# Patient Record
Sex: MALE | Race: WHITE | NOT HISPANIC OR LATINO | Employment: OTHER | URBAN - METROPOLITAN AREA
[De-identification: names, ages, dates, MRNs, and addresses within clinical notes are randomized per-mention and may not be internally consistent; named-entity substitution may affect disease eponyms.]

---

## 2017-01-11 RX ORDER — INDOMETHACIN 25 MG/1
CAPSULE ORAL
Qty: 20 CAPSULE | Refills: 0 | Status: SHIPPED | OUTPATIENT
Start: 2017-01-11 | End: 2018-08-28

## 2018-04-15 ENCOUNTER — OFFICE VISIT (OUTPATIENT)
Dept: RETAIL CLINIC | Facility: CLINIC | Age: 78
End: 2018-04-15

## 2018-04-15 VITALS
OXYGEN SATURATION: 98 % | SYSTOLIC BLOOD PRESSURE: 143 MMHG | RESPIRATION RATE: 16 BRPM | HEART RATE: 75 BPM | TEMPERATURE: 98.5 F | DIASTOLIC BLOOD PRESSURE: 97 MMHG

## 2018-04-15 DIAGNOSIS — K04.7 DENTAL ABSCESS: Primary | ICD-10-CM

## 2018-04-15 PROCEDURE — 99203 OFFICE O/P NEW LOW 30 MIN: CPT | Performed by: NURSE PRACTITIONER

## 2018-04-15 RX ORDER — AMOXICILLIN 875 MG/1
875 TABLET, COATED ORAL 2 TIMES DAILY
Qty: 20 TABLET | Refills: 0 | Status: SHIPPED | OUTPATIENT
Start: 2018-04-15 | End: 2018-04-25

## 2018-04-15 NOTE — PROGRESS NOTES
Subjective   Jovon Knight is a 78 y.o. male.     Dental Pain    This is a new problem. The current episode started in the past 7 days. The problem occurs constantly. The problem has been gradually worsening. The pain is moderate. Associated symptoms include facial pain and sinus pressure. Pertinent negatives include no difficulty swallowing, fever, oral bleeding or thermal sensitivity. Treatments tried: ambusol. The treatment provided no relief.       The following portions of the patient's history were reviewed and updated as appropriate: allergies, current medications, past family history, past medical history, past social history, past surgical history and problem list.    Review of Systems   Constitutional: Positive for fatigue. Negative for appetite change, chills, diaphoresis and fever.   HENT: Positive for dental problem, facial swelling (maxillary ), mouth sores and sinus pressure. Negative for congestion, ear discharge, ear pain, hearing loss, nosebleeds, postnasal drip, rhinorrhea, sinus pain, sneezing, sore throat, trouble swallowing and voice change.    Eyes: Negative for pain, discharge, redness, itching and visual disturbance.   Respiratory: Negative for cough, chest tightness, shortness of breath and wheezing.    Cardiovascular: Negative for chest pain and palpitations.   Gastrointestinal: Negative for nausea.   Musculoskeletal: Negative for myalgias, neck pain and neck stiffness.   Allergic/Immunologic: Positive for environmental allergies. Negative for immunocompromised state.   Neurological: Negative for syncope, weakness and headaches.       Objective   Physical Exam   Constitutional: He is oriented to person, place, and time. He appears well-developed and well-nourished. He is cooperative.  Non-toxic appearance. He does not appear ill. No distress.   HENT:   Head: Normocephalic and atraumatic.   Right Ear: Hearing, tympanic membrane, external ear and ear canal normal.   Left Ear: Hearing,  tympanic membrane, external ear and ear canal normal.   Nose: Right sinus exhibits maxillary sinus tenderness. Right sinus exhibits no frontal sinus tenderness. Left sinus exhibits maxillary sinus tenderness. Left sinus exhibits no frontal sinus tenderness.   Mouth/Throat: Uvula is midline, oropharynx is clear and moist and mucous membranes are normal. Oral lesions present. No uvula swelling. No oropharyngeal exudate, posterior oropharyngeal edema or posterior oropharyngeal erythema. Tonsils are 2+ on the right. Tonsils are 2+ on the left. No tonsillar exudate.       Eyes: Conjunctivae and lids are normal.   Lymphadenopathy:     He has no cervical adenopathy.   Neurological: He is alert and oriented to person, place, and time.   Skin: Skin is warm and dry. He is not diaphoretic.   Vitals reviewed.      Assessment/Plan   Jovon was seen today for dental pain.    Diagnoses and all orders for this visit:    Dental abscess  -     amoxicillin (AMOXIL) 875 MG tablet; Take 1 tablet by mouth 2 (Two) Times a Day for 10 days.          -     Call dentist in AM to request follow up appointment        -     Follow up with [facility] for any worsening symptoms

## 2018-04-15 NOTE — PATIENT INSTRUCTIONS
Please call your dentist tomorrow morning to make an appointment for follow up.    Dental Abscess  A dental abscess is a collection of pus in or around a tooth.  What are the causes?  This condition is caused by a bacterial infection around the root of the tooth that involves the inner part of the tooth (pulp). It may result from:  · Severe tooth decay.  · Trauma to the tooth that allows bacteria to enter into the pulp, such as a broken or chipped tooth.  · Severe gum disease around a tooth.  What are the signs or symptoms?  Symptoms of this condition include:  · Severe pain in and around the infected tooth.  · Swelling and redness around the infected tooth, in the mouth, or in the face.  · Tenderness.  · Pus drainage.  · Bad breath.  · Bitter taste in the mouth.  · Difficulty swallowing.  · Difficulty opening the mouth.  · Nausea.  · Vomiting.  · Chills.  · Swollen neck glands.  · Fever.  How is this diagnosed?  This condition is diagnosed with examination of the infected tooth. During the exam, your dentist may tap on the infected tooth. Your dentist will also ask about your medical and dental history and may order X-rays.  How is this treated?  This condition is treated by eliminating the infection. This may be done with:  · Antibiotic medicine.  · A root canal. This may be performed to save the tooth.  · Pulling (extracting) the tooth. This may also involve draining the abscess. This is done if the tooth cannot be saved.  Follow these instructions at home:  · Take medicines only as directed by your dentist.  · If you were prescribed antibiotic medicine, finish all of it even if you start to feel better.  · Rinse your mouth (gargle) often with salt water to relieve pain or swelling.  · Do not drive or operate heavy machinery while taking pain medicine.  · Do not apply heat to the outside of your mouth.  · Keep all follow-up visits as directed by your dentist. This is important.  Contact a health care provider  if:  · Your pain is worse and is not helped by medicine.  Get help right away if:  · You have a fever or chills.  · Your symptoms suddenly get worse.  · You have a very bad headache.  · You have problems breathing or swallowing.  · You have trouble opening your mouth.  · You have swelling in your neck or around your eye.  This information is not intended to replace advice given to you by your health care provider. Make sure you discuss any questions you have with your health care provider.  Document Released: 12/18/2006 Document Revised: 04/27/2017 Document Reviewed: 12/15/2015  Elsevier Interactive Patient Education © 2017 [employer]

## 2018-08-28 ENCOUNTER — OFFICE VISIT (OUTPATIENT)
Dept: RETAIL CLINIC | Facility: CLINIC | Age: 78
End: 2018-08-28

## 2018-08-28 DIAGNOSIS — M10.9 ACUTE GOUT OF RIGHT FOOT, UNSPECIFIED CAUSE: Primary | ICD-10-CM

## 2018-08-28 PROCEDURE — 99213 OFFICE O/P EST LOW 20 MIN: CPT | Performed by: NURSE PRACTITIONER

## 2018-08-28 RX ORDER — INDOMETHACIN 25 MG/1
25 CAPSULE ORAL 3 TIMES DAILY PRN
Qty: 24 CAPSULE | Refills: 1 | Status: SHIPPED | OUTPATIENT
Start: 2018-08-28 | End: 2019-11-21 | Stop reason: SDUPTHER

## 2018-08-28 RX ORDER — INDOMETHACIN 25 MG/1
CAPSULE ORAL
Qty: 20 CAPSULE | OUTPATIENT
Start: 2018-08-28

## 2018-08-28 NOTE — PATIENT INSTRUCTIONS
Gout  Gout is painful swelling that can occur in some of your joints. Gout is a type of arthritis. This condition is caused by having too much uric acid in your body. Uric acid is a chemical that forms when your body breaks down substances called purines. Purines are important for building body proteins.  When your body has too much uric acid, sharp crystals can form and build up inside your joints. This causes pain and swelling. Gout attacks can happen quickly and be very painful (acute gout). Over time, the attacks can affect more joints and become more frequent (chronic gout). Gout can also cause uric acid to build up under your skin and inside your kidneys.  What are the causes?  This condition is caused by too much uric acid in your blood. This can occur because:  · Your kidneys do not remove enough uric acid from your blood. This is the most common cause.  · Your body makes too much uric acid. This can occur with some cancers and cancer treatments. It can also occur if your body is breaking down too many red blood cells (hemolytic anemia).  · You eat too many foods that are high in purines. These foods include organ meats and some seafood. Alcohol, especially beer, is also high in purines.    A gout attack may be triggered by trauma or stress.  What increases the risk?  This condition is more likely to develop in people who:  · Have a family history of gout.  · Are male and middle-aged.  · Are female and have gone through menopause.  · Are obese.  · Frequently drink alcohol, especially beer.  · Are dehydrated.  · Lose weight too quickly.  · Have an organ transplant.  · Have lead poisoning.  · Take certain medicines, including aspirin, cyclosporine, diuretics, levodopa, and niacin.  · Have kidney disease or psoriasis.    What are the signs or symptoms?  An attack of acute gout happens quickly. It usually occurs in just one joint. The most common place is the big toe. Attacks often start at night. Other joints  that may be affected include joints of the feet, ankle, knee, fingers, wrist, or elbow. Symptoms may include:  · Severe pain.  · Warmth.  · Swelling.  · Stiffness.  · Tenderness. The affected joint may be very painful to touch.  · Shiny, red, or purple skin.  · Chills and fever.    Chronic gout may cause symptoms more frequently. More joints may be involved. You may also have white or yellow lumps (tophi) on your hands or feet or in other areas near your joints.  How is this diagnosed?  This condition is diagnosed based on your symptoms, medical history, and physical exam. You may have tests, such as:  · Blood tests to measure uric acid levels.  · Removal of joint fluid with a needle (aspiration) to look for uric acid crystals.  · X-rays to look for joint damage.    How is this treated?  Treatment for this condition has two phases: treating an acute attack and preventing future attacks. Acute gout treatment may include medicines to reduce pain and swelling, including:  · NSAIDs.  · Steroids. These are strong anti-inflammatory medicines that can be taken by mouth (orally) or injected into a joint.  · Colchicine. This medicine relieves pain and swelling when it is taken soon after an attack. It can be given orally or through an IV tube.    Preventive treatment may include:  · Daily use of smaller doses of NSAIDs or colchicine.  · Use of a medicine that reduces uric acid levels in your blood.  · Changes to your diet. You may need to see a specialist about healthy eating (dietitian).    Follow these instructions at home:  During a Gout Attack  · If directed, apply ice to the affected area:  ? Put ice in a plastic bag.  ? Place a towel between your skin and the bag.  ? Leave the ice on for 20 minutes, 2-3 times a day.  · Rest the joint as much as possible. If the affected joint is in your leg, you may be given crutches to use.  · Raise (elevate) the affected joint above the level of your heart as often as  possible.  · Drink enough fluids to keep your urine clear or pale yellow.  · Take over-the-counter and prescription medicines only as told by your health care provider.  · Do not drive or operate heavy machinery while taking prescription pain medicine.  · Follow instructions from your health care provider about eating or drinking restrictions.  · Return to your normal activities as told by your health care provider. Ask your health care provider what activities are safe for you.  Avoiding Future Gout Attacks  · Follow a low-purine diet as told by your dietitian or health care provider. Avoid foods and drinks that are high in purines, including liver, kidney, anchovies, asparagus, herring, mushrooms, mussels, and beer.  · Limit alcohol intake to no more than 1 drink a day for nonpregnant women and 2 drinks a day for men. One drink equals 12 oz of beer, 5 oz of wine, or 1½ oz of hard liquor.  · Maintain a healthy weight or lose weight if you are overweight. If you want to lose weight, talk with your health care provider. It is important that you do not lose weight too quickly.  · Start or maintain an exercise program as told by your health care provider.  · Drink enough fluids to keep your urine clear or pale yellow.  · Take over-the-counter and prescription medicines only as told by your health care provider.  · Keep all follow-up visits as told by your health care provider. This is important.  Contact a health care provider if:  · You have another gout attack.  · You continue to have symptoms of a gout attack after10 days of treatment.  · You have side effects from your medicines.  · You have chills or a fever.  · You have burning pain when you urinate.  · You have pain in your lower back or belly.  Get help right away if:  · You have severe or uncontrolled pain.  · You cannot urinate.  This information is not intended to replace advice given to you by your health care provider. Make sure you discuss any questions  you have with your health care provider.  Document Released: 12/15/2001 Document Revised: 05/25/2017 Document Reviewed: 09/29/2016  Elsevier Interactive Patient Education © 2018 [employer]

## 2018-08-28 NOTE — PROGRESS NOTES
Subjective   Jovon Knight is a 78 y.o. male.     Gout   This is a new problem. The current episode started 1 to 4 weeks ago. The problem occurs constantly. The problem has been gradually worsening. Associated symptoms include joint swelling. Pertinent negatives include no fever or headaches. Associated symptoms comments: Right proximal big toe joint. The symptoms are aggravated by walking. Treatments tried: warm water, epsom salt, cherry supplements. The treatment provided no relief.        The following portions of the patient's history were reviewed and updated as appropriate: allergies, current medications, past family history, past medical history, past social history, past surgical history and problem list.    Review of Systems   Constitutional: Negative for fever.   HENT: Negative.    Gastrointestinal: Negative.    Genitourinary: Negative.    Musculoskeletal: Positive for gout and joint swelling.   Neurological: Negative.        Objective   Physical Exam   Constitutional: He is cooperative. No distress.   HENT:   Head: Normocephalic.   Right Ear: Hearing, tympanic membrane, external ear and ear canal normal.   Left Ear: Hearing, tympanic membrane, external ear and ear canal normal.   Nose: Nose normal.   Mouth/Throat: Oropharynx is clear and moist.   Eyes: Pupils are equal, round, and reactive to light. Conjunctivae, EOM and lids are normal.   Neck: Trachea normal and full passive range of motion without pain.   Cardiovascular: Normal rate, regular rhythm and normal pulses.    Pulmonary/Chest: Effort normal and breath sounds normal.        Neurological: He is alert.   Skin: Skin is warm. Capillary refill takes less than 2 seconds.   Psychiatric: He has a normal mood and affect. His speech is normal and behavior is normal.   Vitals reviewed.        Assessment/Plan   Jovon was seen today for gout.    Diagnoses and all orders for this visit:    Acute gout of right foot, unspecified cause    Other orders  -      indomethacin (INDOCIN) 25 MG capsule; Take 1 capsule by mouth 3 (Three) Times a Day As Needed for Mild Pain .

## 2018-08-29 RX ORDER — INDOMETHACIN 25 MG/1
CAPSULE ORAL
Qty: 20 CAPSULE | OUTPATIENT
Start: 2018-08-29

## 2019-11-21 ENCOUNTER — OFFICE VISIT (OUTPATIENT)
Dept: RETAIL CLINIC | Facility: CLINIC | Age: 79
End: 2019-11-21

## 2019-11-21 VITALS
TEMPERATURE: 97.8 F | HEART RATE: 64 BPM | SYSTOLIC BLOOD PRESSURE: 164 MMHG | OXYGEN SATURATION: 99 % | DIASTOLIC BLOOD PRESSURE: 95 MMHG

## 2019-11-21 DIAGNOSIS — M10.9 GOUT INVOLVING TOE OF LEFT FOOT, UNSPECIFIED CAUSE, UNSPECIFIED CHRONICITY: Primary | ICD-10-CM

## 2019-11-21 PROCEDURE — 99213 OFFICE O/P EST LOW 20 MIN: CPT | Performed by: NURSE PRACTITIONER

## 2019-11-21 RX ORDER — INDOMETHACIN 25 MG/1
25 CAPSULE ORAL 3 TIMES DAILY PRN
Qty: 24 CAPSULE | Refills: 1 | Status: SHIPPED | OUTPATIENT
Start: 2019-11-21 | End: 2021-03-30 | Stop reason: SDUPTHER

## 2019-11-21 NOTE — PATIENT INSTRUCTIONS
Gout    Gout is a condition that causes painful swelling of the joints. Gout is a type of inflammation of the joints (arthritis). This condition is caused by having too much uric acid in the body. Uric acid is a chemical that forms when the body breaks down substances called purines. Purines are important for building body proteins.  When the body has too much uric acid, sharp crystals can form and build up inside the joints. This causes pain and swelling. Gout attacks can happen quickly and may be very painful (acute gout). Over time, the attacks can affect more joints and become more frequent (chronic gout). Gout can also cause uric acid to build up under the skin and inside the kidneys.  What are the causes?  This condition is caused by too much uric acid in your blood. This can happen because:  · Your kidneys do not remove enough uric acid from your blood. This is the most common cause.  · Your body makes too much uric acid. This can happen with some cancers and cancer treatments. It can also occur if your body is breaking down too many red blood cells (hemolytic anemia).  · You eat too many foods that are high in purines. These foods include organ meats and some seafood. Alcohol, especially beer, is also high in purines.  A gout attack may be triggered by trauma or stress.  What increases the risk?  You are more likely to develop this condition if you:  · Have a family history of gout.  · Are male and middle-aged.  · Are female and have gone through menopause.  · Are obese.  · Frequently drink alcohol, especially beer.  · Are dehydrated.  · Lose weight too quickly.  · Have an organ transplant.  · Have lead poisoning.  · Take certain medicines, including aspirin, cyclosporine, diuretics, levodopa, and niacin.  · Have kidney disease.  · Have a skin condition called psoriasis.  What are the signs or symptoms?  An attack of acute gout happens quickly. It usually occurs in just one joint. The most common place is  the big toe. Attacks often start at night. Other joints that may be affected include joints of the feet, ankle, knee, fingers, wrist, or elbow. Symptoms of this condition may include:  · Severe pain.  · Warmth.  · Swelling.  · Stiffness.  · Tenderness. The affected joint may be very painful to touch.  · Shiny, red, or purple skin.  · Chills and fever.  Chronic gout may cause symptoms more frequently. More joints may be involved. You may also have white or yellow lumps (tophi) on your hands or feet or in other areas near your joints.  How is this diagnosed?  This condition is diagnosed based on your symptoms, medical history, and physical exam. You may have tests, such as:  · Blood tests to measure uric acid levels.  · Removal of joint fluid with a thin needle (aspiration) to look for uric acid crystals.  · X-rays to look for joint damage.  How is this treated?  Treatment for this condition has two phases: treating an acute attack and preventing future attacks. Acute gout treatment may include medicines to reduce pain and swelling, including:  · NSAIDs.  · Steroids. These are strong anti-inflammatory medicines that can be taken by mouth (orally) or injected into a joint.  · Colchicine. This medicine relieves pain and swelling when it is taken soon after an attack. It can be given by mouth or through an IV.  Preventive treatment may include:  · Daily use of smaller doses of NSAIDs or colchicine.  · Use of a medicine that reduces uric acid levels in your blood.  · Changes to your diet. You may need to see a dietitian about what to eat and drink to prevent gout.  Follow these instructions at home:  During a gout attack    · If directed, put ice on the affected area:  ? Put ice in a plastic bag.  ? Place a towel between your skin and the bag.  ? Leave the ice on for 20 minutes, 2-3 times a day.  · Raise (elevate) the affected joint above the level of your heart as often as possible.  · Rest the joint as much as possible.  If the affected joint is in your leg, you may be given crutches to use.  · Follow instructions from your health care provider about eating or drinking restrictions.  Avoiding future gout attacks  · Follow a low-purine diet as told by your dietitian or health care provider. Avoid foods and drinks that are high in purines, including liver, kidney, anchovies, asparagus, herring, mushrooms, mussels, and beer.  · Maintain a healthy weight or lose weight if you are overweight. If you want to lose weight, talk with your health care provider. It is important that you do not lose weight too quickly.  · Start or maintain an exercise program as told by your health care provider.  Eating and drinking  · Drink enough fluids to keep your urine pale yellow.  · If you drink alcohol:  ? Limit how much you use to:  § 0-1 drink a day for women.  § 0-2 drinks a day for men.  ? Be aware of how much alcohol is in your drink. In the [place] one drink equals one 12 oz bottle of beer (355 mL) one 5 oz glass of wine (148 mL), or one 1½ oz glass of hard liquor (44 mL).  General instructions  · Take over-the-counter and prescription medicines only as told by your health care provider.  · Do not drive or use heavy machinery while taking prescription pain medicine.  · Return to your normal activities as told by your health care provider. Ask your health care provider what activities are safe for you.  · Keep all follow-up visits as told by your health care provider. This is important.  Contact a health care provider if you have:  · Another gout attack.  · Continuing symptoms of a gout attack after 10 days of treatment.  · Side effects from your medicines.  · Chills or a fever.  · Burning pain when you urinate.  · Pain in your lower back or belly.  Get help right away if you:  · Have severe or uncontrolled pain.  · Cannot urinate.  Summary  · Gout is painful swelling of the joints caused by inflammation.  · The most common site of pain is the big  toe, but it can affect other joints in the body.  · Medicines and dietary changes can help to prevent and treat gout attacks.  This information is not intended to replace advice given to you by your health care provider. Make sure you discuss any questions you have with your health care provider.  Document Released: 12/15/2001 Document Revised: 07/10/2019 Document Reviewed: 07/10/2019  [employer] Interactive Patient Education © 2019 [employer]

## 2019-11-21 NOTE — PROGRESS NOTES
"Subjective:     Jovon Knight is a 79 y.o.     Patient presents with \"gout\". It is in his left great toe. He has a history of gout., He has taken indomethacin for gout in the past and he had a few left which he took sparingly instead of as directed because he was almost out. It ws in effective. He was drinking a ew beers which seems to set this off but quit that  A couple of weeks ago. The gout has been bothering him for about 3 weeks. The joint is red, swollen, painful.            The following portions of the patient's history were reviewed and updated as appropriate: allergies, current medications, past family history, past medical history, past social history, past surgical history and problem list.      Review of Systems   Constitutional: Negative for fever.   Respiratory: Negative.    Cardiovascular: Negative.    Musculoskeletal: Joint swelling: left great toe, red, swollen, painful.         Objective:      Physical Exam   Constitutional: He appears well-developed and well-nourished.   Cardiovascular: Normal rate, regular rhythm, S1 normal, S2 normal and normal heart sounds.   Pulmonary/Chest: Effort normal and breath sounds normal.   Musculoskeletal:   Left great toe erythematous, edematous   Vitals reviewed.          Diagnoses and all orders for this visit:    Gout involving toe of left foot, unspecified cause, unspecified chronicity  -     Ambulatory Referral to Family Practice    Other orders  -     indomethacin (INDOCIN) 25 MG capsule; Take 1 capsule by mouth 3 (Three) Times a Day As Needed for Mild Pain .    monitor diet  "

## 2020-01-08 PROBLEM — E78.5 HYPERLIPIDEMIA: Status: ACTIVE | Noted: 2020-01-08

## 2020-01-08 PROBLEM — C44.91 BASAL CELL CARCINOMA (BCC): Status: ACTIVE | Noted: 2020-01-08

## 2020-01-08 PROBLEM — C18.9 COLON CANCER (HCC): Status: ACTIVE | Noted: 2020-01-08

## 2022-05-12 ENCOUNTER — TELEPHONE (OUTPATIENT)
Dept: FAMILY MEDICINE CLINIC | Facility: CLINIC | Age: 82
End: 2022-05-12

## 2022-05-12 NOTE — TELEPHONE ENCOUNTER
Nirali, I am not sure that I have had this conversation with him.  Certainly we have not seen him recently.  I think the best thing for him to do would be to establish with Lu or Pinky and then we will see how it goes for this problem since I won't be available in the near future.

## 2022-05-12 NOTE — TELEPHONE ENCOUNTER
Caller: Jovon Knight    Relationship: Self    Best call back number: [phone]    What is the best time to reach you: ANY TIME    Who are you requesting to speak with (clinical staff, provider,  specific staff member): CLINICAL STAFF    What was the call regarding: PATIENT STATES DR. WHITNEY AGREED TO TAKE HIM BACK ON, HE SAW HIM SEVERAL YEARS AGO. SINCE HE IS NOT TAKING NEW PATIENTS, NO SLOTS ARE OPEN IN [facility].    PATIENT IS HAVING PAIN IN RIGHT FOOT, UNABLE TO BEAR WEIGHT    PATIENT STATES IT IS OKAY TO LEAVE VOICEMAIL IF HE DOES NOT ANSWER    PLEASE CALL PATIENT TO SCHEDULE    Do you require a callback: YES

## 2022-05-16 ENCOUNTER — OFFICE VISIT (OUTPATIENT)
Dept: FAMILY MEDICINE CLINIC | Facility: CLINIC | Age: 82
End: 2022-05-16

## 2022-05-16 VITALS
OXYGEN SATURATION: 99 % | SYSTOLIC BLOOD PRESSURE: 112 MMHG | HEART RATE: 69 BPM | WEIGHT: 157 LBS | HEIGHT: 68 IN | BODY MASS INDEX: 23.79 KG/M2 | RESPIRATION RATE: 18 BRPM | DIASTOLIC BLOOD PRESSURE: 70 MMHG | TEMPERATURE: 97.8 F

## 2022-05-16 DIAGNOSIS — M25.551 RIGHT HIP PAIN: ICD-10-CM

## 2022-05-16 DIAGNOSIS — M79.671 RIGHT FOOT PAIN: Primary | ICD-10-CM

## 2022-05-16 PROCEDURE — 99213 OFFICE O/P EST LOW 20 MIN: CPT | Performed by: NURSE PRACTITIONER

## 2022-05-16 NOTE — PROGRESS NOTES
"Chief Complaint  Establish Care and [facility] [place] f/u (5-13-22 right foot pain hurt while mowing grass)    Subjective          Jovon presents to [facility]    82-year-old male presents the office today for establishment of care and follow-up after being seen in the urgent care on 5/13/2022 for some right foot pain after he was mowing the grass.  States that he was wearing some oversized boots and did not have thickening of socks on.  Urgent care thought that he probably did not have enough support on his foot causing instep pain.  He has taken the diclofenac as prescribed and does not report some relief.  He does have some right ankle and thigh/hip soreness that he had post mowing that has also continued to improve.  He denies any swelling.  He is using over-the-counter pain cream with good relief.  No side effects.    Blood pressure in office today was 112/70.  He denies any headache no blurred vision no dizziness no flushing no chest pain or pressure.    He is not fasting today does not want to do labs will return    He has no other complaints at today    The following portions of the patient's history were reviewed and updated as appropriate: allergies, current medications, past family history, past medical history, past social history, past surgical history, and problem list        Review of Systems   Constitutional: Negative for chills, fatigue and fever.   Respiratory: Negative for cough and shortness of breath.    Cardiovascular: Negative for chest pain, palpitations and leg swelling.   Gastrointestinal: Negative for abdominal pain, diarrhea, nausea and vomiting.   Musculoskeletal: Positive for arthralgias.   Neurological: Negative for dizziness and light-headedness.        Objective   Vital Signs:   Vitals:    05/16/22 1038   BP: 112/70   Pulse: 69   Resp: 18   Temp: 97.8 °F (36.6 °C)   SpO2: 99%   Weight: 71.2 kg (157 lb)   Height: 172 cm (67.72\")   PainSc:   2        BMI is within " normal parameters. No follow-up required.       Physical Exam  Vitals reviewed.   Constitutional:       Appearance: Normal appearance. He is not ill-appearing.   HENT:      Head: Normocephalic.      Nose: Nose normal.      Mouth/Throat:      Mouth: Mucous membranes are moist.      Pharynx: Oropharynx is clear.   Eyes:      Conjunctiva/sclera: Conjunctivae normal.   Neck:      Vascular: No carotid bruit.   Cardiovascular:      Rate and Rhythm: Normal rate and regular rhythm.      Pulses: Normal pulses.      Heart sounds: Normal heart sounds. No murmur heard.  Pulmonary:      Effort: Pulmonary effort is normal.      Breath sounds: Normal breath sounds.   Musculoskeletal:      Right foot: Normal range of motion.        Legs:         Feet:    Feet:      Right foot:      Skin integrity: No ulcer, blister, skin breakdown, erythema, warmth, callus, dry skin or fissure.   Skin:     General: Skin is warm.   Neurological:      Mental Status: He is alert and oriented to person, place, and time.   Psychiatric:         Mood and Affect: Mood normal.         Behavior: Behavior normal.         Thought Content: Thought content normal.         Judgment: Judgment normal.          Result Review :     The following data was reviewed by: BEATA Webber on 05/16/2022:      Needs lab work     Assessment and Plan    Diagnoses and all orders for this visit:    1. Right foot pain (Primary)  Comments:  continue diclofenac  RICE  wear supportive footwear when mowing grass/ walking/ hiking    2. Right hip pain  Comments:  improving  RICE  continue current rx    Will refer to podiatry / pt if pt desires      Follow Up   Return in about 6 months (around 11/16/2022) for Annual physical.  Patient was given instructions and counseling regarding his condition or for health maintenance advice. Please see specific information pulled into the AVS if appropriate.

## 2022-12-12 ENCOUNTER — OFFICE VISIT (OUTPATIENT)
Dept: ORTHOPEDIC SURGERY | Facility: CLINIC | Age: 82
End: 2022-12-12

## 2022-12-12 VITALS — BODY MASS INDEX: 23.05 KG/M2 | TEMPERATURE: 97.1 F | HEIGHT: 68 IN | WEIGHT: 152.1 LBS

## 2022-12-12 DIAGNOSIS — M54.16 LUMBAR RADICULAR PAIN: ICD-10-CM

## 2022-12-12 DIAGNOSIS — M47.817 OSTEOARTHRITIS OF LUMBOSACRAL SPINE: Primary | ICD-10-CM

## 2022-12-12 PROCEDURE — 72100 X-RAY EXAM L-S SPINE 2/3 VWS: CPT | Performed by: NURSE PRACTITIONER

## 2022-12-12 PROCEDURE — 99214 OFFICE O/P EST MOD 30 MIN: CPT | Performed by: NURSE PRACTITIONER

## 2022-12-12 RX ORDER — METHYLPREDNISOLONE 4 MG/1
TABLET ORAL
Qty: 21 TABLET | Refills: 0 | Status: SHIPPED | OUTPATIENT
Start: 2022-12-12

## 2022-12-18 NOTE — PROGRESS NOTES
[facility]  New Problem      Patient Name: Jovon Knight  : 1940  Primary Care Physician: Pinky Ludwig APRN        Chief Complaint: Right hip and leg pain  HPI:   Jovon Knight is a 82 y.o. year old who presents today for evaluation of right hip and leg pain.  Patient reports a history of right hip and lower leg pain that started around May 2022 he was mowing the yard and noticed the pain began and has since progressed.  He says most of his discomfort is in the lower calf pains about a 4-5, pain in the hip and thighs about a 2-3.  He says his pain is worse with walking uphill, going down hills okay, sitting and walking precipitate the calf pain is better with rest.  He does have occasional numbness and tingling in the right lower extremity, no bowel or bladder changes.  He does have a history of gout flareups.  He was recently prescribed low-dose meloxicam he is been taking that for the last week without much improvement so far.  He was previously very avid runner logged over 20,000 miles at 1 point and participated in numerous races.  Past medical history significant for colon cancer gallbladder removal and as noted below.      Past Medical/Surgical, Social and Family History:  I have reviewed and/or updated pertinent history as noted in the medical record including:  Past Medical History:   Diagnosis Date   • Allergic    • Cancer (HCC)     colon   • Gout      Past Surgical History:   Procedure Laterality Date   • CHOLECYSTECTOMY     • COLON SURGERY     • DENTAL PROCEDURE      during teens, 2 dental implants      Social History     Occupational History   • Not on file   Tobacco Use   • Smoking status: Never   • Smokeless tobacco: Never   Vaping Use   • Vaping Use: Never used   Substance and Sexual Activity   • Alcohol use: No   • Drug use: No   • Sexual activity: Defer          Allergies: No Known Allergies    Medications:   Home Medications:  Current Outpatient Medications on File Prior to  Visit   Medication Sig   • meloxicam (MOBIC) 7.5 MG tablet Take 1 tablet by mouth Daily.     No current facility-administered medications on file prior to visit.         ROS:  14 point review of systems was negative except as listed in the HPI.    Physical Exam:   82 y.o. male  Body mass index is 23.13 kg/m²., 69 kg (152 lb 1.6 oz)  Vitals:    12/12/22 1031   Temp: 97.1 °F (36.2 °C)     General: Alert, cooperative, appears well and in no observable distress. Appears stated age and BMI as listed above.  HEENT: Normocephalic, atraumatic on external visual inspection.  CV: No significant peripheral edema.  Respiratory: Normal respiratory effort.  Skin: Warm & well perfused; appropriate skin turgor.  Psych: Appropriate mood & affect.  Neuro: Gross sensation and motor intact in affected extremity/extremities.  Vascular: Peripheral pulses palpable in affected extremity/extremities.     MSK Exam:  Lumbar Spine:  No obvious deformity.  Tenderness to palpation: None  ROM limited without pain.  negative SLR test on the bilateral  Quad strength equal BLE, equal pedal strength with DF/PF, extends great toe BLE.     Range of motion in the knees and hips are smooth and symmetric without significant pain.  Negative SHANIQUA test.  Calves are soft and nontender.      Radiology:    The following X-rays were ordered/reviewed today to evaluate the patient's symptoms: Spine: AP and Lateral view of the lumbar spine shows Significant degenerative changes, scoliosis throughout the lumbar spine, hypertrophic facet joint changes.  Prominent vascular calcifications are noted in the aorta and pelvis.  He does have some narrowing on the hip joint on the right although the hip is incompletely imaged on this exam..  I have no other prior spine films for comparison.    I also reviewed prior films from primary care of his right foot which demonstrate prominent vascular calcifications as well.    Procedure:   N/A    Procedures    Misc. Data/Labs:  N/A    Assessment & Plan:    ICD-10-CM ICD-9-CM   1. Osteoarthritis of lumbosacral spine  M47.817 721.3   2. Lumbar radicular pain  M54.16 724.4     New Medications Ordered This Visit   Medications   • methylPREDNISolone (MEDROL) 4 MG dose pack     Sig: Use as directed by package instructions     Dispense:  21 tablet     Refill:  0     Orders Placed This Encounter   Procedures   • XR Spine Lumbar 2 or 3 View     This is an 82-year-old male with right lower extremity pain its been going on and worsening since May of this year.  I suspect his symptoms are related to lumbar spine pathology.  He does have a history of gout flareups which I have considered, also the prominence of vascular calcifications could be concerning for intermittent claudication.  I am going to start him on a Medrol Dosepak and see if this improves his symptoms.  I asked him to hold the meloxicam while he is taking the Dosepak if he gets great relief I would feel this is more coming from his lumbar spine if he gets no relief we might consider additional work-up perhaps ABIs but his lower extremities appear warm and well-perfused on exam today.    Ultimately we may need to consider an MRI of the lumbar spine and referring him onto neurosurgery.    Return in about 3 weeks (around 1/2/2023) for Recheck.    Patient encouraged to call with questions or concerns prior to follow up.  Recommend ICE and/or HEAT PRN as discussed.  Will discuss with attending physician as needed.  Consider additional referrals, work up and/or advanced imaging as indicated or if patient fails to respond to conservative care.        Jan Rogers, APRN   no

## 2023-01-10 ENCOUNTER — OFFICE VISIT (OUTPATIENT)
Dept: ORTHOPEDIC SURGERY | Facility: CLINIC | Age: 83
End: 2023-01-10
Payer: MEDICARE

## 2023-01-10 VITALS — TEMPERATURE: 98.6 F | WEIGHT: 149.3 LBS | HEIGHT: 68 IN | BODY MASS INDEX: 22.63 KG/M2

## 2023-01-10 DIAGNOSIS — Z00.00 HEALTHCARE MAINTENANCE: ICD-10-CM

## 2023-01-10 DIAGNOSIS — M47.817 OSTEOARTHRITIS OF LUMBOSACRAL SPINE: Primary | ICD-10-CM

## 2023-01-10 DIAGNOSIS — I73.9 CLAUDICATION OF RIGHT LOWER EXTREMITY: ICD-10-CM

## 2023-01-10 DIAGNOSIS — M54.16 LUMBAR RADICULAR PAIN: ICD-10-CM

## 2023-01-10 DIAGNOSIS — I99.8 VASCULAR CALCIFICATION: ICD-10-CM

## 2023-01-10 PROCEDURE — 99214 OFFICE O/P EST MOD 30 MIN: CPT | Performed by: NURSE PRACTITIONER

## 2023-01-10 RX ORDER — ASPIRIN 81 MG/1
81 TABLET, CHEWABLE ORAL DAILY
COMMUNITY

## 2023-01-10 NOTE — PROGRESS NOTES
[facility]              Follow Up      Patient Name: Jovon Knight  : 1940  Primary Care Physician: Pinky Ludwig APRN        Chief Complaint:  Right leg pain.    HPI:   Jovon Knight is a 82 y.o. year old who presents today for follow up evaluation of right leg pain. Patient reports a history of right hip and lower leg pain that started around May 2022 he was mowing the yard and noticed the pain began and has since progressed.  He says most of his discomfort is in the lower calf pains about a 4-5, pain in the hip and thighs about a 2-3.  He says his pain is worse with walking uphill, going down hills okay, sitting and walking precipitate the calf pain is better with rest.  He does have occasional numbness and tingling in the right lower extremity, no bowel or bladder changes.  He does have a history of gout flareups.  He was previously very avid runner logged over 20,000 miles at 1 point and participated in numerous races.  Past medical history significant for colon cancer gallbladder removal and as noted below.     At the last visit 3 weeks ago we got some imaging of his lumbar spine which showed significant degenerative changes.  I wanted to try a Medrol Dosepak to try and help control the symptoms to see if this was neurologic claudication.  He says he got no relief whatsoever from the Dosepak.  Previously he was prescribed a low-dose of meloxicam which also did nothing to alleviate his symptoms.      Past Medical/Surgical, Social and Family History:  I have reviewed and/or updated pertinent history as noted in the medical record including:  Past Medical History:   Diagnosis Date   • Allergic    • Cancer (HCC)     colon   • Gout      Past Surgical History:   Procedure Laterality Date   • CHOLECYSTECTOMY     • COLON SURGERY     • DENTAL PROCEDURE      during teens, 2 dental implants      Social History     Occupational History   • Not on file   Tobacco Use   • Smoking status: Never   •  Smokeless tobacco: Never   Vaping Use   • Vaping Use: Never used   Substance and Sexual Activity   • Alcohol use: No   • Drug use: No   • Sexual activity: Defer          Allergies: No Known Allergies    Medications:   Home Medications:  Current Outpatient Medications on File Prior to Visit   Medication Sig   • aspirin 81 MG chewable tablet Chew 81 mg Daily.   • meloxicam (MOBIC) 7.5 MG tablet Take 1 tablet by mouth Daily.   • methylPREDNISolone (MEDROL) 4 MG dose pack Use as directed by package instructions     No current facility-administered medications on file prior to visit.         ROS:  ROS negative except as listed in the HPI.    Physical Exam:   82 y.o. male  Body mass index is 22.7 kg/m²., 67.7 kg (149 lb 4.8 oz)  Vitals:    01/10/23 1050   Temp: 98.6 °F (37 °C)     General: Alert, cooperative, appears well and in no observable distress. Appears stated age and BMI as listed above.  HEENT: Normocephalic, atraumatic on external visual inspection.  CV: No significant peripheral edema.  Respiratory: Normal respiratory effort.  Skin: Warm & well perfused; appropriate skin turgor.  Psych: Appropriate mood & affect.  Neuro: Gross sensation and motor intact in affected extremity/extremities.  Vascular: Peripheral pulses palpable in affected extremity/extremities.     MSK Exam:  Lumbar Spine:  No obvious deformity.  Tenderness to palpation: None  ROM limited without pain.  negative SLR test on the bilateral lower extremities.  He does have some thigh muscle spasms with passive straight leg raise on the right.  Quad strength equal BLE, equal pedal strength with DF/PF, extends great toe BLE.      Range of motion in the knees and hips are smooth and symmetric without significant pain.  Negative SHANIQUA test.  Calves are soft and nontender.  Lower extremities have appropriate color and skin turgor, no obvious cyanosis.     Radiology:    No new images were needed at the visit today.      Previous imaging from  12/12/22:    Spine: AP and Lateral view of the lumbar spine shows Significant degenerative changes, scoliosis throughout the lumbar spine, hypertrophic facet joint changes.  Prominent vascular calcifications are noted in the aorta and pelvis.  He does have some narrowing on the hip joint on the right although the hip is incompletely imaged on this exam..  I have no other prior spine films for comparison.     I also reviewed prior films from primary care of his right foot which demonstrate prominent vascular calcifications as well.    Procedure:   N/A    Procedures    Misc. Data/Labs: N/A    Assessment & Plan:    ICD-10-CM ICD-9-CM   1. Osteoarthritis of lumbosacral spine  M47.817 721.3   2. Lumbar radicular pain  M54.16 724.4   3. Healthcare maintenance  Z00.00 V70.0   4. Vascular calcification  I99.8 459.89   5. Claudication of right lower extremity (HCC)  I73.9 443.9     No orders of the defined types were placed in this encounter.    Orders Placed This Encounter   Procedures   • MRI Lumbar Spine Without Contrast   • US Ankle / Brachial Indices Extremity Complete   • Ambulatory Referral to Internal Medicine       This is an 82-year-old male with ongoing right lower extremity pain that is suggestive of claudication.  He certainly has enough degenerative changes in the lumbar spine that might account for this, however previous imaging also showed some prominent vascular calcifications throughout his lower extremity and even up into the pelvis although his extremities are warm and well-perfused.  Plan is to get an MRI of his lumbar spine to better evaluate and treat his condition, I am also going to order ABIs to better evaluate his perfusion.  I will review the findings of the MRI and refer him onto specialist or perhaps for some epidural injections.  If the ABIs show anything significant we will plan to refer him to vascular surgery.  This is a very active gentleman his pain has worsened and do not think that any  formal physical therapy would benefit right him right now but would certainly consider that in the future.    Return for follow up after the MRI.    Patient encouraged to call with questions or concerns prior to follow up.  Recommend ICE and/or HEAT PRN as discussed.  Will discuss with attending physician as needed.  Consider additional referrals, work up and/or advanced imaging as indicated or if patient fails to respond to conservative care.        BEATA Nelson      Dictated Utilizing Dragon Dictation

## 2023-01-12 ENCOUNTER — TRANSCRIBE ORDERS (OUTPATIENT)
Dept: ADMINISTRATIVE | Facility: HOSPITAL | Age: 83
End: 2023-01-12
Payer: COMMERCIAL

## 2023-01-12 DIAGNOSIS — I73.9 CLAUDICATION OF RIGHT LOWER EXTREMITY: Primary | ICD-10-CM

## 2023-02-07 ENCOUNTER — TELEPHONE (OUTPATIENT)
Dept: ORTHOPEDIC SURGERY | Facility: CLINIC | Age: 83
End: 2023-02-07
Payer: COMMERCIAL

## 2023-02-07 NOTE — TELEPHONE ENCOUNTER
BEATA Barajas  Patient states he doesn't want to do the doppler that you order or the MRI at this time.  I have explained to him what a doppler is for and he still doesn't not want to do at this time.  He wants to wait till the weather is better.  Patient states he will think about it and if he decideds to do either test he will call back.

## 2023-07-13 ENCOUNTER — TELEPHONE (OUTPATIENT)
Dept: PEDIATRICS | Facility: OTHER | Age: 83
End: 2023-07-13

## 2023-07-13 NOTE — TELEPHONE ENCOUNTER
Caller: Jovon Knight    Relationship: Self    Best call back number: [phone]     Who are you requesting to speak with (clinical staff, provider,  specific staff member): PROVIDER OR MA    What was the call regarding: PATIENT STATES HE RECEIVED A PHONE CALL FROM HIS PHARMACY THAT THEY WERE NOT ABLE TO REFILL A CERTAIN PRESCRIPTION BUT PATIENT WAS UNAWARE OF ANY PRESCRIPTIONS BEING CALLED IN.     PATIENT IS REQUESTING A CALL BACK TO DISCUSS THIS.

## 2023-08-16 ENCOUNTER — APPOINTMENT (OUTPATIENT)
Dept: ULTRASOUND IMAGING | Facility: HOSPITAL | Age: 83
End: 2023-08-16
Payer: MEDICARE

## 2023-08-16 ENCOUNTER — HOSPITAL ENCOUNTER (OUTPATIENT)
Dept: MRI IMAGING | Facility: HOSPITAL | Age: 83
Discharge: HOME | End: 2023-08-16
Admitting: NURSE PRACTITIONER
Payer: MEDICARE

## 2023-08-16 ENCOUNTER — TELEPHONE (OUTPATIENT)
Dept: FAMILY MEDICINE CLINIC | Facility: CLINIC | Age: 83
End: 2023-08-16
Payer: COMMERCIAL

## 2023-08-16 DIAGNOSIS — M54.31 CHRONIC SCIATICA OF RIGHT SIDE: ICD-10-CM

## 2023-08-16 PROCEDURE — 72148 MRI LUMBAR SPINE W/O DYE: CPT

## 2023-08-16 NOTE — TELEPHONE ENCOUNTER
Caller: Jovon Knight    Relationship: Self    Best call back number: [phone](CAN LEAVE A VOICEMAIL)    What is the best time to reach you: ANYTIME    Who are you requesting to speak with (clinical staff, provider,  specific staff member): BEATA MUJICA    What was the call regarding: PATIENT WANTED TO LET BEATA MUJICA KNOW THAT HE HAS COMPLETED HIS MRI TODAY 8/16/23 AND IS REQUESTING A CALLBACK TO KNOW WHAT THE EXT STEPS WOULD BE.

## 2023-08-22 ENCOUNTER — OFFICE VISIT (OUTPATIENT)
Dept: FAMILY MEDICINE CLINIC | Facility: CLINIC | Age: 83
End: 2023-08-22
Payer: MEDICARE

## 2023-08-22 VITALS
BODY MASS INDEX: 21.98 KG/M2 | WEIGHT: 145 LBS | OXYGEN SATURATION: 97 % | HEIGHT: 68 IN | DIASTOLIC BLOOD PRESSURE: 83 MMHG | HEART RATE: 78 BPM | TEMPERATURE: 98.5 F | SYSTOLIC BLOOD PRESSURE: 144 MMHG

## 2023-08-22 DIAGNOSIS — G89.29 CHRONIC RIGHT-SIDED LOW BACK PAIN WITH RIGHT-SIDED SCIATICA: ICD-10-CM

## 2023-08-22 DIAGNOSIS — M54.31 CHRONIC SCIATICA OF RIGHT SIDE: ICD-10-CM

## 2023-08-22 DIAGNOSIS — M25.551 RIGHT HIP PAIN: ICD-10-CM

## 2023-08-22 DIAGNOSIS — G89.29 CHRONIC BILATERAL LOW BACK PAIN WITH RIGHT-SIDED SCIATICA: ICD-10-CM

## 2023-08-22 DIAGNOSIS — M79.604 RIGHT LEG PAIN: Primary | ICD-10-CM

## 2023-08-22 DIAGNOSIS — M54.41 CHRONIC RIGHT-SIDED LOW BACK PAIN WITH RIGHT-SIDED SCIATICA: ICD-10-CM

## 2023-08-22 DIAGNOSIS — M54.41 CHRONIC BILATERAL LOW BACK PAIN WITH RIGHT-SIDED SCIATICA: ICD-10-CM

## 2023-08-22 PROCEDURE — 99213 OFFICE O/P EST LOW 20 MIN: CPT | Performed by: NURSE PRACTITIONER

## 2023-08-22 NOTE — PROGRESS NOTES
Chief Complaint  Hip Pain (Back pain/Sciatica)    Chad Sigala presents to [facility] as a 83-year-old male for follow-up   on continued low back and right hip and leg pain  This has been ongoing for several years.  He has seen orthopedic in the past they did feel that there was more spine related or claudication versus any hip injury.  He states the pain is worse with any prolonged activities or bending lifting or twisting.  Pain is intermittent 4-6 out of 10.  He describes it as aching and shooting at times.  He did discuss this with Ortho in the past he has tried Mobic and prednisone without much symptom relief.  He did have an MRI and TIFFANI but has not followed up with those testing    He continues to have some increased anxiety but he did declined to start taking the Cymbalta at prescribed last visit.  He does not want to take any antidepressants.  He is not interested in counseling at this time.  He does not want to take any medication he would like to try physical therapy    He declined referral to vascular-  cancelled that appointment    He is still not fasting and will return for labs    He states that his blood pressure is usually normal when he checks it at home it is increased when he is in the office he has whitecoat syndrome.  His blood pressure at home is usually 110s to 120s over 70 to 80s.  He denies any increase or changes in headaches no blurred vision no dizziness no flushing no chest pain or pressure    He has no other acute complaints of today    The following portions of the patient's history were reviewed and updated as appropriate: allergies, current medications, past family history, past medical history, past social history, past surgical history, and problem list      Review of Systems   Constitutional:  Negative for chills, fatigue and fever.   Eyes:  Negative for visual disturbance.   Respiratory:  Negative for cough, shortness of breath and  "wheezing.    Cardiovascular:  Negative for chest pain, palpitations and leg swelling.   Gastrointestinal:  Negative for abdominal pain, diarrhea, nausea and vomiting.   Musculoskeletal:  Positive for arthralgias and back pain.   Neurological:  Negative for dizziness and light-headedness.   Psychiatric/Behavioral:  Negative for self-injury and suicidal ideas.       Objective   Vital Signs:   Vitals:    08/22/23 1049   BP: 144/83   Pulse: 78   Temp: 98.5 øF (36.9 øC)   SpO2: 97%   Weight: 65.8 kg (145 lb)   Height: 172.7 cm (67.99\")        BMI is within normal parameters. No other follow-up for BMI required.        Physical Exam  Vitals reviewed.   Constitutional:       General: He is not in acute distress.  Eyes:      Conjunctiva/sclera: Conjunctivae normal.   Neck:      Thyroid: No thyromegaly.      Vascular: No carotid bruit.   Cardiovascular:      Rate and Rhythm: Normal rate and regular rhythm.      Heart sounds: Normal heart sounds.   Pulmonary:      Effort: Pulmonary effort is normal.      Breath sounds: Normal breath sounds. No stridor. No wheezing, rhonchi or rales.   Chest:      Chest wall: No tenderness.   Musculoskeletal:      Lumbar back: No swelling, edema, deformity, signs of trauma, lacerations, spasms, tenderness or bony tenderness. Decreased range of motion. Negative right straight leg raise test and negative left straight leg raise test. No scoliosis.        Back:         Legs:    Neurological:      Mental Status: He is alert.   Psychiatric:         Attention and Perception: Attention normal.         Mood and Affect: Mood normal.        Result Review :     The following data was reviewed by: BEATA Webber on 08/22/2023:      MRI Lumbar Spine Without Contrast (08/16/2023 14:27)    US Ankle / Brachial Indices Extremity Complete (07/13/2023 10:53)     FINDINGS: The T12-L1 intervertebral disc appears relatively normal.  There is mild facet joint arthropathy with no significant " canal  stenosis.     At L1-2 there are small right and left intraforaminal disc protrusions  with bilateral foraminal narrowing left slightly worse than right and  these are in close approximation to the exiting bilateral L1 nerve  roots. There is bilateral facet joint arthropathy and mild canal  stenosis.     At L2-3 there is moderate broad-based disc bulge with bilateral  foraminal narrowing. There is facet joint arthropathy and mild to  moderate canal stenosis.     At L3-4 there is moderate broad-based disc bulge. There is moderately  severe left facet joint arthropathy and less severe right facet joint  arthropathy with mild to moderate canal stenosis.     At L4-5 there is a mild broad-based disc bulge including small right and  left intraforaminal disc protrusions left greater than right. There is  bilateral facet joint arthropathy and mild circumferential canal  stenosis. There is approximately 5 mm anterolisthesis of L4 on L5 with  some uncovering of the intervertebral disc.     The L5-S1 intervertebral disc shows mild disc bulge best seen on  sagittal images. There is a mild facet joint arthropathy with no canal  stenosis.     Signal intensity of the bony structures appears relatively unremarkable.  Lower cord and conus appear normal.     IMPRESSION:  1. There is multilevel lumbar degenerative disease including an element  of canal stenosis. Please see full discussion above.       Assessment and Plan    Diagnoses and all orders for this visit:    1. Right leg pain (Primary)  -     Ambulatory Referral to Physical Therapy Evaluate and treat    2. Right hip pain  -     Ambulatory Referral to Physical Therapy Evaluate and treat    3. Chronic right-sided low back pain with right-sided sciatica  -     Ambulatory Referral to Physical Therapy Evaluate and treat    4. Chronic sciatica of right side  -     Ambulatory Referral to Physical Therapy Evaluate and treat    5. Chronic bilateral low back pain with right-sided  sciatica  -     Ambulatory Referral to Physical Therapy Evaluate and treat      Follow-up with any worsening symptoms or no improvements  Would like to try physical therapy prior to any other referrals or medication      Follow Up   Return if symptoms worsen or fail to improve.  Patient was given instructions and counseling regarding his condition or for health maintenance advice. Please see specific information pulled into the AVS if appropriate.

## 2023-08-31 ENCOUNTER — TREATMENT (OUTPATIENT)
Dept: PHYSICAL THERAPY | Facility: CLINIC | Age: 83
End: 2023-08-31
Payer: MEDICARE

## 2023-08-31 DIAGNOSIS — G89.29 CHRONIC RIGHT-SIDED LOW BACK PAIN WITH RIGHT-SIDED SCIATICA: ICD-10-CM

## 2023-08-31 DIAGNOSIS — M54.41 CHRONIC RIGHT-SIDED LOW BACK PAIN WITH RIGHT-SIDED SCIATICA: ICD-10-CM

## 2023-08-31 DIAGNOSIS — M79.604 PAIN IN POSTERIOR RIGHT LOWER EXTREMITY: Primary | ICD-10-CM

## 2023-08-31 DIAGNOSIS — I73.9 VASCULAR CLAUDICATION: ICD-10-CM

## 2023-08-31 NOTE — PROGRESS NOTES
Physical Therapy Initial Evaluation and Plan of Care  [facility] [address], [address]  [place] [address]     Patient: Jovon Knight   : 1940  Referring practitioner: Pinky Ludwig APRN  Date of Initial Visit: 2023  Today's Date: 2023  Patient seen for 1 sessions  PT Clinic location: [address], [address] [place] [address].  [id number]          Visit Diagnoses:    ICD-10-CM ICD-9-CM   1. Pain in posterior right lower extremity  M79.604 729.5   2. Vascular claudication  I73.9 443.9       Subjective Questionnaire: Back Index 7/50 - 14%    Subjective Evaluation    History of Present Illness  Mechanism of injury: Pt presents with ~9 month history of right sided leg pain including posterior hip, posterior thigh, and posterior calf pains. He reports mostly leg pain, minimal overall back pains.     He reports that he first noticed pain in the leg almost immediately after mowing his yard.     MRI recently taken indicated OA and other mild degenerative changes throughout with some findings of central canal stenosis.    He notes supine or seated position improves pain significantly, but has pain with walking particularly on uphill stretches or when mowing grass. He reports no pain with prolonged standing, and pain always eases with sitting/rest. He notes that a compressive calf wrap seems to only slightly help the pain level.     Pt reports from 36 to 56 he ran consistently ~3.5 miles daily. Denies regular physical exercise since age 56, other than yard work and house maintenance.    Retired, worked as  which was almost all desk based work.    Referred by PCP for TIFFANI due to concerns for vascular claudication, however pt refused this referral due to desire for PT to assess for sciatica.    Pain  At best pain ratin  At worst pain ratin  Quality: dull ache, cramping and discomfort    Patient Goals  Patient goals  for therapy: independence with ADLs/IADLs and decreased pain    Medical history: colon cancer with temporary iliostomy, cholecystectomy ~10 years ago. See chart for further detail.   Therapy Precautions: N/A        Objective          Active Range of Motion     Additional Active Range of Motion Details  Flex 80%  Ext 40%  L SB 75%  R SB 75%    Strength/Myotome Testing     Left Hip   Planes of Motion   Flexion: 5  External rotation: 4+  Internal rotation: 4+    Right Hip   Planes of Motion   Flexion: 4+ (mild R hip discomfort)  External rotation: 4+  Internal rotation: 4+    Left Knee   Flexion: 5  Extension: 5    Right Knee   Flexion: 4+ (mild R calf discomfort)  Extension: 4+    Left Ankle/Foot   Dorsiflexion: 5    Right Ankle/Foot   Dorsiflexion: 4 (mild R ankle pain)    Tests       Thoracic   Positive slump (slight R discomfort with knee extension).     Lumbar   Negative repeated extension and repeated flexion.     Left   Negative passive SLR and quadrant.     Right   Negative passive SLR (tight in calf, not effected by sciatic bias, no reproduction of pain) and quadrant.     Left Hip   Negative SHANIQUA, FADIR and scour.     Right Hip   Positive FADIR.   Negative SHANIQUA and scour.     Additional Tests Details  Static prone press WNL    Functional Assessment     Comments  Treadmill test: onset of symptoms within 2 minutes of walking at 15% incline at 0.9mi/hr  Bike test: no relief of symptoms with gradual worsening of symptoms in flexed position on recumbent bike at resistance level 8        Assessment & Plan       Assessment  Impairments: activity intolerance and pain with function   Functional limitations: walking and pushing   Assessment details: Pt presents to PT with complaints of R LE pain from hip to calf. He notes provocative factors as including uphill walking and pushing his lawnmower. Denies provocation of symptoms with standing, flat or decline walking, or single flights of stairs.   His objective  assessment included negative testing for lumbar dysfunction, peripheral nerve or LE joint dysfunction, and DVT type findings. He demonstrated reproduction of symptoms during incline treadmill test as well as recumbent bike testing, which is consistent with diagnosis of vascular claudication. He was referred for further testing to his PCP with education on diagnosis and some exercises to address minor LE strength imbalances and tightness.    Plan  Therapy options: will not be seen for skilled therapy services  Treatment plan discussed with: patient  Plan details: Return to PCP, discussed vascular claudication and TIFFANI referral      See flowsheets for treatment detail.  Education: vascular claudication, assessment findings, next steps of return to PCP and TIFFANI testing    History # of Personal Factors and/or Comorbidities: MODERATE (1-2)  Examination of Body System(s): # of elements: LOW (1-2)  Clinical Presentation: STABLE   Clinical Decision Making: LOW       Timed:         Manual Therapy:         mins  09490;     Therapeutic Exercise:         mins  34296;     Neuromuscular Brennen:        mins  43685;    Therapeutic Activity:     15     mins  19609;     Gait Training:           mins  03911;     Ultrasound:          mins  88035;    Ionto                                   mins   60971  Self Care                       25     mins   60043      Un-Timed:  Electrical Stimulation:         mins  06960 ( );  Dry Needling          mins self-pay  Traction          mins 07927  Low Eval     20     Mins  48495  Mod Eval          Mins  80861  High Eval                            Mins  79153  Re-Eval                               mins  82804      Timed Treatment:   40   mins   Total Treatment:     60   mins    PT SIGNATURE: Adalberto Soria PT   [place] PT license #: [id number]  DATE TREATMENT INITIATED: 9/1/2023    Initial Certification  Certification Period: 11/29/2023  I certify that the therapy services are furnished while this  patient is under my care.  The services outlined above are required by this patient, and will be reviewed every 90 days.    PHYSICIAN: Pinky Ludwig APRN  NPI: [id number]                                      DATE:     Please sign and return via fax to [facility] - Fax #: [phone]. Thank you, [facility].

## 2024-04-10 ENCOUNTER — TELEPHONE (OUTPATIENT)
Dept: FAMILY MEDICINE CLINIC | Facility: CLINIC | Age: 84
End: 2024-04-10
Payer: COMMERCIAL

## 2024-04-10 NOTE — TELEPHONE ENCOUNTER
Spoke with patient concerning overdue lab orders. Patient does not want to schedule an appointment at this time. Patient stated that he would call in a couple of months

## 2024-05-29 ENCOUNTER — TELEPHONE (OUTPATIENT)
Dept: FAMILY MEDICINE CLINIC | Facility: CLINIC | Age: 84
End: 2024-05-29

## 2024-05-29 NOTE — TELEPHONE ENCOUNTER
Caller: Jovon Knight    Relationship: Self    Best call back number: [phone]    What is the best time to reach you: ANY    Who are you requesting to speak with (clinical staff, provider,  specific staff member): CLINICAL    Do you know the name of the person who called: PATIENT    What was the call regarding: PATIENT RECEIVED A CALL PROCLAIMING TO BE FROM THE OFFICE OF [facility] AND STATED HE WAS GOING TO LOSE HIS MEDICARE BENEFITS IF HE DID NOT COMPLY WITH CERTAIN THINGS.    FIRST WAS A SWAB TEST AND ALSO WAS TOLD THAT HE WOULD NEED TO GET A NEW PLASTIC CARD WITH HIS INFORMATION ON IT.    PATIENT THEN CALLED MEDICARE DIRECT AND THEY INFORMED HIM THAT IT IS A SCAM AND TO DISREGARD THE CALL.      Is it okay if the provider responds through MyChart: NO

## 2024-09-12 ENCOUNTER — TELEPHONE (OUTPATIENT)
Dept: FAMILY MEDICINE CLINIC | Facility: CLINIC | Age: 84
End: 2024-09-12
Payer: COMMERCIAL

## 2024-09-12 NOTE — TELEPHONE ENCOUNTER
[facility] AT Portal to Registered [facility] - [phone]  - [phone]    REF #[id number]    Relationship to patient: Pharmacy    Best call back number: [phone]    Patient is needing: THEY RECEIVED CHART NOTES TODAY AND WOULD LIKE TO KNOW WHAT THEY ARE FOR?  IS THIS FOR A MEDICATION REQUEST OR A SURGERY?  PLEASE CALL AND ADVISE.

## 2024-09-13 NOTE — TELEPHONE ENCOUNTER
Pharmacy said there unable to help because the original caller didn't leave there name so they could acces the acct

## 2025-02-11 ENCOUNTER — APPOINTMENT (OUTPATIENT)
Dept: GENERAL RADIOLOGY | Facility: HOSPITAL | Age: 85
DRG: 554 | End: 2025-02-11
Payer: MEDICARE

## 2025-02-11 ENCOUNTER — HOSPITAL ENCOUNTER (INPATIENT)
Facility: HOSPITAL | Age: 85
LOS: 3 days | Discharge: NURSING, REHAB OR LONG-TERM CARE | DRG: 554 | End: 2025-02-14
Attending: STUDENT IN AN ORGANIZED HEALTH CARE EDUCATION/TRAINING PROGRAM | Admitting: INTERNAL MEDICINE
Payer: MEDICARE

## 2025-02-11 DIAGNOSIS — N18.9 CHRONIC KIDNEY DISEASE, UNSPECIFIED CKD STAGE: ICD-10-CM

## 2025-02-11 DIAGNOSIS — M86.9 OSTEOMYELITIS OF SECOND TOE OF LEFT FOOT: ICD-10-CM

## 2025-02-11 DIAGNOSIS — M10.9 GOUT OF BOTH FEET: Primary | ICD-10-CM

## 2025-02-11 PROBLEM — E79.0 HYPERURICEMIA: Status: ACTIVE | Noted: 2025-02-11

## 2025-02-11 LAB
ALBUMIN SERPL-MCNC: 3.8 G/DL (ref 3.5–5.2)
ALBUMIN/GLOB SERPL: 0.9 G/DL
ALP SERPL-CCNC: 115 U/L (ref 39–117)
ALT SERPL W P-5'-P-CCNC: 12 U/L (ref 1–41)
ANION GAP SERPL CALCULATED.3IONS-SCNC: 13 MMOL/L (ref 5–15)
AST SERPL-CCNC: 17 U/L (ref 1–40)
BASOPHILS # BLD AUTO: 0.02 10*3/MM3 (ref 0–0.2)
BASOPHILS NFR BLD AUTO: 0.2 % (ref 0–1.5)
BILIRUB SERPL-MCNC: 0.4 MG/DL (ref 0–1.2)
BUN SERPL-MCNC: 38 MG/DL (ref 8–23)
BUN/CREAT SERPL: 27.3 (ref 7–25)
CALCIUM SPEC-SCNC: 9.7 MG/DL (ref 8.6–10.5)
CHLORIDE SERPL-SCNC: 111 MMOL/L (ref 98–107)
CO2 SERPL-SCNC: 21 MMOL/L (ref 22–29)
CREAT SERPL-MCNC: 1.39 MG/DL (ref 0.76–1.27)
CRP SERPL-MCNC: 17.1 MG/DL (ref 0–0.5)
DEPRECATED RDW RBC AUTO: 45.2 FL (ref 37–54)
EGFRCR SERPLBLD CKD-EPI 2021: 49.4 ML/MIN/1.73
EOSINOPHIL # BLD AUTO: 0 10*3/MM3 (ref 0–0.4)
EOSINOPHIL NFR BLD AUTO: 0 % (ref 0.3–6.2)
ERYTHROCYTE [DISTWIDTH] IN BLOOD BY AUTOMATED COUNT: 13.4 % (ref 12.3–15.4)
ERYTHROCYTE [SEDIMENTATION RATE] IN BLOOD: 82 MM/HR (ref 0–20)
GLOBULIN UR ELPH-MCNC: 4.2 GM/DL
GLUCOSE SERPL-MCNC: 107 MG/DL (ref 65–99)
HCT VFR BLD AUTO: 43.4 % (ref 37.5–51)
HGB BLD-MCNC: 14.1 G/DL (ref 13–17.7)
HOLD SPECIMEN: NORMAL
HOLD SPECIMEN: NORMAL
IMM GRANULOCYTES # BLD AUTO: 0.04 10*3/MM3 (ref 0–0.05)
IMM GRANULOCYTES NFR BLD AUTO: 0.4 % (ref 0–0.5)
LYMPHOCYTES # BLD AUTO: 0.89 10*3/MM3 (ref 0.7–3.1)
LYMPHOCYTES NFR BLD AUTO: 8.5 % (ref 19.6–45.3)
MCH RBC QN AUTO: 29.9 PG (ref 26.6–33)
MCHC RBC AUTO-ENTMCNC: 32.5 G/DL (ref 31.5–35.7)
MCV RBC AUTO: 91.9 FL (ref 79–97)
MONOCYTES # BLD AUTO: 0.77 10*3/MM3 (ref 0.1–0.9)
MONOCYTES NFR BLD AUTO: 7.3 % (ref 5–12)
NEUTROPHILS NFR BLD AUTO: 8.81 10*3/MM3 (ref 1.7–7)
NEUTROPHILS NFR BLD AUTO: 83.6 % (ref 42.7–76)
NRBC BLD AUTO-RTO: 0 /100 WBC (ref 0–0.2)
PLATELET # BLD AUTO: 310 10*3/MM3 (ref 140–450)
PMV BLD AUTO: 8.7 FL (ref 6–12)
POTASSIUM SERPL-SCNC: 4.8 MMOL/L (ref 3.5–5.2)
PROT SERPL-MCNC: 8 G/DL (ref 6–8.5)
RBC # BLD AUTO: 4.72 10*6/MM3 (ref 4.14–5.8)
SODIUM SERPL-SCNC: 145 MMOL/L (ref 136–145)
URATE SERPL-MCNC: 10.6 MG/DL (ref 3.4–7)
WBC NRBC COR # BLD AUTO: 10.53 10*3/MM3 (ref 3.4–10.8)
WHOLE BLOOD HOLD COAG: NORMAL
WHOLE BLOOD HOLD SPECIMEN: NORMAL

## 2025-02-11 PROCEDURE — 73630 X-RAY EXAM OF FOOT: CPT

## 2025-02-11 PROCEDURE — 63710000001 PREDNISONE PER 1 MG: Performed by: STUDENT IN AN ORGANIZED HEALTH CARE EDUCATION/TRAINING PROGRAM

## 2025-02-11 PROCEDURE — 25010000002 HEPARIN (PORCINE) PER 1000 UNITS: Performed by: INTERNAL MEDICINE

## 2025-02-11 PROCEDURE — 85652 RBC SED RATE AUTOMATED: CPT | Performed by: STUDENT IN AN ORGANIZED HEALTH CARE EDUCATION/TRAINING PROGRAM

## 2025-02-11 PROCEDURE — 84550 ASSAY OF BLOOD/URIC ACID: CPT | Performed by: STUDENT IN AN ORGANIZED HEALTH CARE EDUCATION/TRAINING PROGRAM

## 2025-02-11 PROCEDURE — 99285 EMERGENCY DEPT VISIT HI MDM: CPT

## 2025-02-11 PROCEDURE — 36415 COLL VENOUS BLD VENIPUNCTURE: CPT

## 2025-02-11 PROCEDURE — 25810000003 SODIUM CHLORIDE 0.9 % SOLUTION: Performed by: INTERNAL MEDICINE

## 2025-02-11 PROCEDURE — 80053 COMPREHEN METABOLIC PANEL: CPT | Performed by: STUDENT IN AN ORGANIZED HEALTH CARE EDUCATION/TRAINING PROGRAM

## 2025-02-11 PROCEDURE — 86140 C-REACTIVE PROTEIN: CPT | Performed by: STUDENT IN AN ORGANIZED HEALTH CARE EDUCATION/TRAINING PROGRAM

## 2025-02-11 PROCEDURE — 85025 COMPLETE CBC W/AUTO DIFF WBC: CPT | Performed by: STUDENT IN AN ORGANIZED HEALTH CARE EDUCATION/TRAINING PROGRAM

## 2025-02-11 RX ORDER — ACETAMINOPHEN 160 MG/5ML
650 SOLUTION ORAL EVERY 4 HOURS PRN
Status: DISCONTINUED | OUTPATIENT
Start: 2025-02-11 | End: 2025-02-14 | Stop reason: HOSPADM

## 2025-02-11 RX ORDER — ONDANSETRON 2 MG/ML
4 INJECTION INTRAMUSCULAR; INTRAVENOUS EVERY 6 HOURS PRN
Status: DISCONTINUED | OUTPATIENT
Start: 2025-02-11 | End: 2025-02-14 | Stop reason: HOSPADM

## 2025-02-11 RX ORDER — ACETAMINOPHEN 650 MG/1
650 SUPPOSITORY RECTAL EVERY 4 HOURS PRN
Status: DISCONTINUED | OUTPATIENT
Start: 2025-02-11 | End: 2025-02-14 | Stop reason: HOSPADM

## 2025-02-11 RX ORDER — ACETAMINOPHEN 325 MG/1
650 TABLET ORAL EVERY 4 HOURS PRN
Status: DISCONTINUED | OUTPATIENT
Start: 2025-02-11 | End: 2025-02-14 | Stop reason: HOSPADM

## 2025-02-11 RX ORDER — SODIUM CHLORIDE 0.9 % (FLUSH) 0.9 %
10 SYRINGE (ML) INJECTION AS NEEDED
Status: DISCONTINUED | OUTPATIENT
Start: 2025-02-11 | End: 2025-02-14 | Stop reason: HOSPADM

## 2025-02-11 RX ORDER — SODIUM CHLORIDE 0.9 % (FLUSH) 0.9 %
10 SYRINGE (ML) INJECTION EVERY 12 HOURS SCHEDULED
Status: DISCONTINUED | OUTPATIENT
Start: 2025-02-11 | End: 2025-02-14 | Stop reason: HOSPADM

## 2025-02-11 RX ORDER — SODIUM CHLORIDE 9 MG/ML
100 INJECTION, SOLUTION INTRAVENOUS CONTINUOUS
Status: DISCONTINUED | OUTPATIENT
Start: 2025-02-11 | End: 2025-02-12

## 2025-02-11 RX ORDER — HEPARIN SODIUM 5000 [USP'U]/ML
5000 INJECTION, SOLUTION INTRAVENOUS; SUBCUTANEOUS EVERY 12 HOURS SCHEDULED
Status: DISCONTINUED | OUTPATIENT
Start: 2025-02-11 | End: 2025-02-14 | Stop reason: HOSPADM

## 2025-02-11 RX ORDER — SODIUM CHLORIDE 9 MG/ML
40 INJECTION, SOLUTION INTRAVENOUS AS NEEDED
Status: DISCONTINUED | OUTPATIENT
Start: 2025-02-11 | End: 2025-02-14 | Stop reason: HOSPADM

## 2025-02-11 RX ORDER — PREDNISONE 20 MG/1
40 TABLET ORAL ONCE
Status: COMPLETED | OUTPATIENT
Start: 2025-02-11 | End: 2025-02-11

## 2025-02-11 RX ORDER — COLCHICINE 0.6 MG/1
0.6 TABLET ORAL EVERY 12 HOURS SCHEDULED
Status: DISCONTINUED | OUTPATIENT
Start: 2025-02-11 | End: 2025-02-14 | Stop reason: HOSPADM

## 2025-02-11 RX ORDER — OXYCODONE HYDROCHLORIDE 5 MG/1
5 TABLET ORAL ONCE
Status: COMPLETED | OUTPATIENT
Start: 2025-02-11 | End: 2025-02-11

## 2025-02-11 RX ADMIN — COLCHICINE 0.6 MG: 0.6 TABLET ORAL at 21:19

## 2025-02-11 RX ADMIN — HEPARIN SODIUM 5000 UNITS: 5000 INJECTION INTRAVENOUS; SUBCUTANEOUS at 21:19

## 2025-02-11 RX ADMIN — Medication 10 ML: at 21:20

## 2025-02-11 RX ADMIN — PREDNISONE 40 MG: 20 TABLET ORAL at 13:20

## 2025-02-11 RX ADMIN — OXYCODONE HYDROCHLORIDE 5 MG: 5 TABLET ORAL at 13:21

## 2025-02-11 RX ADMIN — SODIUM CHLORIDE 100 ML/HR: 9 INJECTION, SOLUTION INTRAVENOUS at 18:06

## 2025-02-11 NOTE — PLAN OF CARE
Problem: Adult Inpatient Plan of Care  Goal: Absence of Hospital-Acquired Illness or Injury  Intervention: Identify and Manage Fall Risk  Recent Flowsheet Documentation  Taken 2/11/2025 1800 by Ho Negron RN  Safety Promotion/Fall Prevention:   activity supervised   assistive device/personal items within reach   clutter free environment maintained   fall prevention program maintained   gait belt   lighting adjusted   mobility aid in reach   muscle strengthening facilitated   nonskid shoes/slippers when out of bed   room organization consistent   safety round/check completed  Taken 2/11/2025 1600 by Ho Negron RN  Safety Promotion/Fall Prevention:   activity supervised   assistive device/personal items within reach   clutter free environment maintained   elopement precautions   fall prevention program maintained   gait belt   lighting adjusted   mobility aid in reach   muscle strengthening facilitated   nonskid shoes/slippers when out of bed   room organization consistent   safety round/check completed  Intervention: Prevent Skin Injury  Recent Flowsheet Documentation  Taken 2/11/2025 1600 by Ho Negron, RN  Body Position:   tilted   supine   turned   right   Goal Outcome Evaluation:

## 2025-02-11 NOTE — ED TRIAGE NOTES
Patient to ED via EMS from home c/o bilateral non-traumatic foot pain x 5 days. Patient reports he has been without gout medication for a while.

## 2025-02-11 NOTE — ED PROVIDER NOTES
EMERGENCY DEPARTMENT ENCOUNTER  Room Number:  HE5/X  PCP: Pinky Ludwig APRN  Independent Historians: Patient      HPI:  Chief Complaint: had concerns including Foot Pain.     A complete HPI/ROS/PMH/PSH/SH/FH are unobtainable due to: None    Chronic or social conditions impacting patient care (Social Determinants of Health): None      Context: Jovon Knight is a 84 y.o. male with a medical history of gout, hyperlipidemia who presents to the ED c/o acute bilateral foot pain and unable to ambulate.  He states has been worsening for the past 5 days, but he is unable to stand or walk today due to the pain in his feet.  States he had similar pain last year and it was gout.  Denies any trauma.  No other complaints at this time.    Review of prior external notes (non-ED) -and- Review of prior external test results outside of this encounter:  Family medicine office visit 11/21/2019, presents with gout in his left great toe.  Taken indomethacin for gout in the past.      Prescription drug monitoring program review:         PAST MEDICAL HISTORY  Active Ambulatory Problems     Diagnosis Date Noted    Colon cancer 01/08/2020    Hyperlipidemia 01/08/2020    Basal cell carcinoma (BCC) 01/08/2020     Resolved Ambulatory Problems     Diagnosis Date Noted    No Resolved Ambulatory Problems     Past Medical History:   Diagnosis Date    Allergic     Cancer     Gout          PAST SURGICAL HISTORY  Past Surgical History:   Procedure Laterality Date    CHOLECYSTECTOMY      COLON SURGERY      DENTAL PROCEDURE      during teens, 2 dental implants          FAMILY HISTORY  Family History   Problem Relation Age of Onset    Stroke Mother     Heart failure Father          SOCIAL HISTORY  Social History     Socioeconomic History    Marital status:    Tobacco Use    Smoking status: Never    Smokeless tobacco: Never   Vaping Use    Vaping status: Never Used   Substance and Sexual Activity    Alcohol use: No    Drug use: No    Sexual  Subjective:       Patient ID: Alexander Hoyos is a 27 y.o. male.    Chief Complaint: No chief complaint on file.    HPI  Review of Systems      Objective:      Physical Exam    Assessment:       Problem List Items Addressed This Visit    None     Visit Diagnoses     COVID-19 ruled out    -  Primary    Relevant Orders    SARS-CoV2 (COVID) with FLU Antigen          Plan:       Covid and flu only           activity: Defer         ALLERGIES  Patient has no known allergies.      REVIEW OF SYSTEMS  Review of Systems  Included in HPI  All systems reviewed and negative except for those discussed in HPI.      PHYSICAL EXAM    I have reviewed the triage vital signs and nursing notes.    ED Triage Vitals [02/11/25 1100]   Temp Heart Rate Resp BP SpO2   98 °F (36.7 °C) 69 14 161/85 99 %      Temp src Heart Rate Source Patient Position BP Location FiO2 (%)   Oral -- -- -- --       Physical Exam  GENERAL: alert, no acute distress  SKIN: Warm, dry  HENT: Normocephalic, atraumatic  EYES: no scleral icterus  CV: regular rhythm, regular rate  RESPIRATORY: normal effort, lungs clear  MUSCULOSKELETAL: no deformity, patient has generalized tenderness to bilateral feet worse over the first MTP bilaterally, left second toe is edematous and tender  NEURO: alert, moves all extremities, follows commands            LAB RESULTS  Recent Results (from the past 24 hours)   Comprehensive Metabolic Panel    Collection Time: 02/11/25 11:11 AM    Specimen: Blood   Result Value Ref Range    Glucose 107 (H) 65 - 99 mg/dL    BUN 38 (H) 8 - 23 mg/dL    Creatinine 1.39 (H) 0.76 - 1.27 mg/dL    Sodium 145 136 - 145 mmol/L    Potassium 4.8 3.5 - 5.2 mmol/L    Chloride 111 (H) 98 - 107 mmol/L    CO2 21.0 (L) 22.0 - 29.0 mmol/L    Calcium 9.7 8.6 - 10.5 mg/dL    Total Protein 8.0 6.0 - 8.5 g/dL    Albumin 3.8 3.5 - 5.2 g/dL    ALT (SGPT) 12 1 - 41 U/L    AST (SGOT) 17 1 - 40 U/L    Alkaline Phosphatase 115 39 - 117 U/L    Total Bilirubin 0.4 0.0 - 1.2 mg/dL    Globulin 4.2 gm/dL    A/G Ratio 0.9 g/dL    BUN/Creatinine Ratio 27.3 (H) 7.0 - 25.0    Anion Gap 13.0 5.0 - 15.0 mmol/L    eGFR 49.4 (L) >60.0 mL/min/1.73   Uric Acid    Collection Time: 02/11/25 11:11 AM    Specimen: Blood   Result Value Ref Range    Uric Acid 10.6 (H) 3.4 - 7.0 mg/dL   CBC Auto Differential    Collection Time: 02/11/25 11:11 AM    Specimen: Blood   Result Value Ref Range    WBC  10.53 3.40 - 10.80 10*3/mm3    RBC 4.72 4.14 - 5.80 10*6/mm3    Hemoglobin 14.1 13.0 - 17.7 g/dL    Hematocrit 43.4 37.5 - 51.0 %    MCV 91.9 79.0 - 97.0 fL    MCH 29.9 26.6 - 33.0 pg    MCHC 32.5 31.5 - 35.7 g/dL    RDW 13.4 12.3 - 15.4 %    RDW-SD 45.2 37.0 - 54.0 fl    MPV 8.7 6.0 - 12.0 fL    Platelets 310 140 - 450 10*3/mm3    Neutrophil % 83.6 (H) 42.7 - 76.0 %    Lymphocyte % 8.5 (L) 19.6 - 45.3 %    Monocyte % 7.3 5.0 - 12.0 %    Eosinophil % 0.0 (L) 0.3 - 6.2 %    Basophil % 0.2 0.0 - 1.5 %    Immature Grans % 0.4 0.0 - 0.5 %    Neutrophils, Absolute 8.81 (H) 1.70 - 7.00 10*3/mm3    Lymphocytes, Absolute 0.89 0.70 - 3.10 10*3/mm3    Monocytes, Absolute 0.77 0.10 - 0.90 10*3/mm3    Eosinophils, Absolute 0.00 0.00 - 0.40 10*3/mm3    Basophils, Absolute 0.02 0.00 - 0.20 10*3/mm3    Immature Grans, Absolute 0.04 0.00 - 0.05 10*3/mm3    nRBC 0.0 0.0 - 0.2 /100 WBC   Green Top (Gel)    Collection Time: 02/11/25 11:11 AM   Result Value Ref Range    Extra Tube Hold for add-ons.    Lavender Top    Collection Time: 02/11/25 11:11 AM   Result Value Ref Range    Extra Tube hold for add-on    Gold Top - SST    Collection Time: 02/11/25 11:11 AM   Result Value Ref Range    Extra Tube Hold for add-ons.    Light Blue Top    Collection Time: 02/11/25 11:11 AM   Result Value Ref Range    Extra Tube Hold for add-ons.          RADIOLOGY  XR Foot 3+ View Left, XR Foot 3+ View Right    Result Date: 2/11/2025  XR FOOT 3+ VW LEFT-, XR FOOT 3+ VW RIGHT-  INDICATIONS: Pain, Gout   TECHNIQUE: 4 views of the left foot, 5 views of the right foot  COMPARISON: Right foot x-ray from 5/13/2022  FINDINGS:  Erosion of the medial aspect of the right first metatarsal head appears similar to prior exam, again compatible with gout. Erosion is also evident at the medial margin of the left first metatarsal head, with overhanging osteophyte, likewise compatible with gout, but correlate clinically to exclude any possibility of infectious etiology.  Small erosion at the medial margin of the base of the left second middle phalanx is indeterminate, could for example represent inflammatory arthropathy such as gout, or could be evidence of osteomyelitis, correlate clinically. No other erosions are noted. No acute fracture is noted. Mild right and mild to moderate left calcaneal spurring is present. Mild osteoarthritic degenerative changes are present. Soft tissue swelling is seen in both feet, especially around the bilateral great toes, left second toe, and around the bilateral fifth metatarsal phalangeal joints. Arterial calcifications are conspicuous in both feet. If there is clinical suspicion for radiographically occult osteomyelitis, bone scan or MRI can be obtained.       As described.    This report was finalized on 2/11/2025 12:51 PM by Dr. Zeb Mart M.D on Workstation: [id number]         MEDICATIONS GIVEN IN ER  Medications   predniSONE (DELTASONE) tablet 40 mg (has no administration in time range)   oxyCODONE (ROXICODONE) immediate release tablet 5 mg (has no administration in time range)         ORDERS PLACED DURING THIS VISIT:  Orders Placed This Encounter   Procedures    XR Foot 3+ View Left    XR Foot 3+ View Right    MRI Foot Left With & Without Contrast    Comprehensive Metabolic Panel    [place] Draw    Uric Acid    CBC Auto Differential    C-reactive Protein    Sedimentation Rate    [facility] (on-call MD unless specified) Details    Inpatient Admission    CBC & Differential    Green Top (Gel)    Lavender Top    Gold Top - SST    Light Blue Top         OUTPATIENT MEDICATION MANAGEMENT:  Current Facility-Administered Medications Ordered in Epic   Medication Dose Route Frequency Provider Last Rate Last Admin    oxyCODONE (ROXICODONE) immediate release tablet 5 mg  5 mg Oral Once Pete Rubio MD        predniSONE (DELTASONE) tablet 40 mg  40 mg Oral Once Pete Rubio MD         No current [facility]-ordered outpatient medications on file.          PROCEDURES  Procedures            PROGRESS, DATA ANALYSIS, CONSULTS, AND MEDICAL DECISION MAKING  All labs have been independently interpreted by me.  All radiology studies have been reviewed by me. All EKG's have been independently viewed and interpreted by me.  Discussion below represents my analysis of pertinent findings related to patient's condition, differential diagnosis, treatment plan and final disposition.    Differential diagnosis includes but is not limited to gout, fracture, cellulitis, other crystal arthropathy, tenosynovitis, cellulitis, osteomyelitis.    Clinical Scores:                                       ED Course as of 02/11/25 1311   Tue Feb 11, 2025   1132 Patient reports he has pain to both of his feet.  Has been worsening for the past few days but today has been unable to bear any weight on them.  Denies any history of trauma.  States he had gout last year and it is the same today.    On exam patient has generalized tenderness to both feet, worst over the first MTP.  Patient's left second toe is also edematous    Will obtain basic labs, uric acid, x-ray of bilateral feet.  Patient is agreeable plan [DN]   1148 Uric Acid(!): 10.6 [DN]   1149 Creatinine(!): 1.39 [DN]   1149 WBC: 10.53 [DN]   1231 XR Foot 3+ View Right [DN]   1231 XR Foot 3+ View Left  I reviewed x-ray image of bilateral feet.  Degenerative changes of bilateral first MTPs, bony erosion proximal aspect middle phalanx left second toe, possible osteomyelitis, interpreted by self  I reviewed radiologist interpretation of x-ray images [DN]   1305 I discussed results with patient.  Due to difficulty walking due to bilateral foot pain, lives alone, and possible osteomyelitis of his left second toe, he is agreeable with plan for admission.  Will give oral oxycodone and prednisone for symptoms [DN]   1310 I discussed patient Dr. [facility], agreeable with plan to admit [DN]      ED Course User Index  [DN] Pete Rubio MD              AS OF 13:11 EST VITALS:    BP - 161/85  HR - 69  TEMP - 98 °F (36.7 °C) (Oral)  O2 SATS - 99%    COMPLEXITY OF CARE  The patient requires admission.      DIAGNOSIS  Final diagnoses:   Gout of both feet   Osteomyelitis of second toe of left foot         DISPOSITION  ED Disposition       ED Disposition   Decision to Admit    Condition   --    Comment   Level of Care: Med/Surg [1]   Diagnosis: Gout [057482]   Admitting Physician: VENKAT CHAVEZ [[id number]]   Attending Physician: VENKAT CHAVEZ [[id number]]   Certification: I Certify That Inpatient Hospital Services Are Medically Necessary For Greater Than 2 Midnights                  Please note that portions of this document were completed with a voice recognition program.    Note Disclaimer: At [facility], we believe that sharing information builds trust and better relationships. You are receiving this note because you recently visited [facility]. It is possible you will see health information before a provider has talked with you about it. This kind of information can be easy to misunderstand. To help you fully understand what it means for your health, we urge you to discuss this note with your provider.         Pete Rubio MD  02/11/25 1156       Pete Rubio MD  02/11/25 1311

## 2025-02-11 NOTE — ED NOTES
Pt does not want MRI, states he recently just had one and doesn't feel there is a need for another. Refused to fill out screening forms at this time.

## 2025-02-11 NOTE — H&P
Internal medicine history and physical  INTERNAL MEDICINE   [facility]       Patient Identification:  Name: Jovon Knight  Age: 84 y.o.  Sex: male  :  1940  MRN: [id number]                   Primary Care Physician: Pinky Ludwig APRN                               Date of admission:2025    Chief Complaint: Persistent progressive pain in both feet over the course of last 4 to 5 days.    History of Present Illness:   Patient is a 84-year-old male with history of gout, prior history of colon cancer from which he has had surgery who lives by himself and does not take any medications on a regular basis was in his usual state of his health until about 4 5 days ago when he started having progressive pain and discomfort in his both feet.  He has reached a point that he cannot bear weight on his feet and literally living in his bedroom.  He is unable to go to the bathroom and crawls to his wall below and urinate in his socks in the last 3 to 4 days.  Today his discomfort was so bad and he was not getting any better so he decided to call EMS and was brought to the emergency room for further evaluation.  In the emergency room he was noted to have bilateral first metatarsophalangeal joint inflammation as well as some swelling and inflammation of the left second toe.  Plain x-ray shows evidence of gouty arthritis with expected changes and calcified circulation.  Concern was raised for possibility of osteomyelitis of the second toe.  Because of his immobility and unable to take care of himself as a result of severe gout affecting his bilateral feet hospital admission is requested.  Patient was noted to have elevated uric acid level and slightly elevated creatinine.  Upon review of his chart in epic I could not find prior metabolic panels to see what was his baseline creatinine.  Patient after receiving steroid claims that his discomfort in his feet is better.  Patient denies any fever and  chills.  He has preserved appetite.      Past Medical History:  Past Medical History:   Diagnosis Date    Allergic     Cancer     colon    Gout      Past Surgical History:  Past Surgical History:   Procedure Laterality Date    CHOLECYSTECTOMY      COLON SURGERY      DENTAL PROCEDURE      during teens, 2 dental implants       Home Meds:  (Not in a hospital admission)    Current Meds:   No current facility-administered medications for this encounter.  No current outpatient medications on file.  Allergies:  No Known Allergies  Social History:   Social History     Tobacco Use    Smoking status: Never    Smokeless tobacco: Never   Substance Use Topics    Alcohol use: No      Family History:  Family History   Problem Relation Age of Onset    Stroke Mother     Heart failure Father           Review of Systems  See history of present illness and past medical history.       Vitals:   /86   Pulse 69   Temp 98 °F (36.7 °C) (Oral)   Resp 14   SpO2 99%   I/O: No intake or output data in the 24 hours ending 02/11/25 3048  Exam:  Patient is examined using the personal protective equipment as per guidelines from infection control for this particular patient as enacted.  Hand washing was performed before and after patient interaction.  General Appearance:    Alert, cooperative, no distress, appears stated age   Head:    Normocephalic, without obvious abnormality, atraumatic   Eyes:    PERRL, conjunctiva/corneas clear, EOM's intact, both eyes   Ears:    Normal external ear canals, both ears   Nose:   Nares normal, septum midline, mucosa normal, no drainage    or sinus tenderness   Throat:   Lips, tongue, gums normal; oral mucosa pink and moist   Neck: Supple and no adenopathy.   Back:     Symmetric, no curvature, ROM normal, no CVA tenderness   Lungs:     Clear to auscultation bilaterally, respirations unlabored   Chest Wall:    No tenderness or deformity    Heart:  S1-S2 regular   Abdomen:   Soft nontender   Extremities:  Bilateral first metatarsal phalangeal joint warmth and erythema and tenderness.  Deformity and swelling of the second toe of the left foot without any open wound.   Pulses:   Pulses palpable in all extremities; symmetric all extremities   Skin: Poor hygiene of his bilateral feet.   Neurologic: Alert and oriented x 3 grossly nonfocal examination gait not tested.       Data Review:      I reviewed the patient's new clinical results.  Results from last 7 days   Lab Units 02/11/25  1111   WBC 10*3/mm3 10.53   HEMOGLOBIN g/dL 14.1   PLATELETS 10*3/mm3 310     Results from last 7 days   Lab Units 02/11/25  1111   SODIUM mmol/L 145   POTASSIUM mmol/L 4.8   CHLORIDE mmol/L 111*   CO2 mmol/L 21.0*   BUN mg/dL 38*   CREATININE mg/dL 1.39*   CALCIUM mg/dL 9.7   GLUCOSE mg/dL 107*     Microbiology Results (last 10 days)       ** No results found for the last 240 hours. **        Uric acid level 10.6  XR Foot 3+ View Left    Result Date: 2/11/2025   As described.    This report was finalized on 2/11/2025 12:51 PM by Dr. Zeb Mart M.D on Workstation: [id number]      XR Foot 3+ View Right    Result Date: 2/11/2025   As described.    This report was finalized on 2/11/2025 12:51 PM by Dr. Zeb Mart M.D on Workstation: [id number]       Assessment:  Active Hospital Problems    Diagnosis  POA    **Gout [M10.9]  Yes    Hyperuricemia [E79.0]  Unknown    CKD (chronic kidney disease) [N18.9]  Unknown    Colon cancer [C18.9]  Yes    Hyperlipidemia [E78.5]  Yes       Medical decision making/care plan: See admitting orders  Acute gout involving bilateral first metatarsal phalangeal joint and possibly second toe of the left foot with decreased mobility with similar symptoms occurring in the past-plan is to continue with prednisone and colchicine, assess his pain level and function and based on his clinical course consider further workup such as podiatry evaluation and possibly MRI.  If his function and pain is better and able to  ambulate then patient could be discharged on treatment for acute gout with outpatient follow-up with the primary care provider.  Abnormal creatinine unclear whether his acute kidney disease on chronic kidney disease or chronic kidney disease at there is no baseline prior BMP or CMP available-provide him with cautious hydration while receiving treatment for acute gout with colchicine and prednisone.  Repeat CMP in the morning and based on the creatinine decide further workup.  Hyperuricemia-patient probably need workup in the outpatient setting just decided to decide if he is hyper  versus under secretor or combination of both so that chronic regimen can be initiated to prevent future episodes of gout which could range from combination of allopurinol and probenecid.  Possible dehydration and declining function due to imposed immobility-continue with IV fluids and consult physical therapy to decide proper disposition in his current living situation.    Marci Curtis MD   2/11/2025  15:48 EST    Parts of this note may be an electronic transcription/translation of spoken language to printed text using the Dragon dictation system.

## 2025-02-12 ENCOUNTER — APPOINTMENT (OUTPATIENT)
Dept: MRI IMAGING | Facility: HOSPITAL | Age: 85
DRG: 554 | End: 2025-02-12
Payer: MEDICARE

## 2025-02-12 LAB
ALBUMIN SERPL-MCNC: 2.8 G/DL (ref 3.5–5.2)
ALBUMIN/GLOB SERPL: 0.8 G/DL
ALP SERPL-CCNC: 88 U/L (ref 39–117)
ALT SERPL W P-5'-P-CCNC: 9 U/L (ref 1–41)
ANION GAP SERPL CALCULATED.3IONS-SCNC: 10.5 MMOL/L (ref 5–15)
AST SERPL-CCNC: 14 U/L (ref 1–40)
BASOPHILS # BLD AUTO: 0.01 10*3/MM3 (ref 0–0.2)
BASOPHILS NFR BLD AUTO: 0.1 % (ref 0–1.5)
BILIRUB SERPL-MCNC: 0.3 MG/DL (ref 0–1.2)
BUN SERPL-MCNC: 41 MG/DL (ref 8–23)
BUN/CREAT SERPL: 28.1 (ref 7–25)
CALCIUM SPEC-SCNC: 8.7 MG/DL (ref 8.6–10.5)
CHLORIDE SERPL-SCNC: 111 MMOL/L (ref 98–107)
CO2 SERPL-SCNC: 21.5 MMOL/L (ref 22–29)
CREAT SERPL-MCNC: 1.46 MG/DL (ref 0.76–1.27)
DEPRECATED RDW RBC AUTO: 43.9 FL (ref 37–54)
EGFRCR SERPLBLD CKD-EPI 2021: 46.5 ML/MIN/1.73
EOSINOPHIL # BLD AUTO: 0 10*3/MM3 (ref 0–0.4)
EOSINOPHIL NFR BLD AUTO: 0 % (ref 0.3–6.2)
ERYTHROCYTE [DISTWIDTH] IN BLOOD BY AUTOMATED COUNT: 13.6 % (ref 12.3–15.4)
GLOBULIN UR ELPH-MCNC: 3.7 GM/DL
GLUCOSE SERPL-MCNC: 145 MG/DL (ref 65–99)
HBA1C MFR BLD: 5.3 % (ref 4.8–5.6)
HCT VFR BLD AUTO: 33.2 % (ref 37.5–51)
HGB BLD-MCNC: 11.2 G/DL (ref 13–17.7)
IMM GRANULOCYTES # BLD AUTO: 0.04 10*3/MM3 (ref 0–0.05)
IMM GRANULOCYTES NFR BLD AUTO: 0.4 % (ref 0–0.5)
LYMPHOCYTES # BLD AUTO: 0.6 10*3/MM3 (ref 0.7–3.1)
LYMPHOCYTES NFR BLD AUTO: 6.2 % (ref 19.6–45.3)
MCH RBC QN AUTO: 30.2 PG (ref 26.6–33)
MCHC RBC AUTO-ENTMCNC: 33.7 G/DL (ref 31.5–35.7)
MCV RBC AUTO: 89.5 FL (ref 79–97)
MONOCYTES # BLD AUTO: 0.48 10*3/MM3 (ref 0.1–0.9)
MONOCYTES NFR BLD AUTO: 5 % (ref 5–12)
NEUTROPHILS NFR BLD AUTO: 8.49 10*3/MM3 (ref 1.7–7)
NEUTROPHILS NFR BLD AUTO: 88.3 % (ref 42.7–76)
NRBC BLD AUTO-RTO: 0 /100 WBC (ref 0–0.2)
NT-PROBNP SERPL-MCNC: 855 PG/ML (ref 0–1800)
PLATELET # BLD AUTO: 255 10*3/MM3 (ref 140–450)
PMV BLD AUTO: 9.1 FL (ref 6–12)
POTASSIUM SERPL-SCNC: 4.3 MMOL/L (ref 3.5–5.2)
PROT SERPL-MCNC: 6.5 G/DL (ref 6–8.5)
RBC # BLD AUTO: 3.71 10*6/MM3 (ref 4.14–5.8)
SODIUM SERPL-SCNC: 143 MMOL/L (ref 136–145)
WBC NRBC COR # BLD AUTO: 9.62 10*3/MM3 (ref 3.4–10.8)

## 2025-02-12 PROCEDURE — 73720 MRI LWR EXTREMITY W/O&W/DYE: CPT

## 2025-02-12 PROCEDURE — 83036 HEMOGLOBIN GLYCOSYLATED A1C: CPT | Performed by: INTERNAL MEDICINE

## 2025-02-12 PROCEDURE — 85025 COMPLETE CBC W/AUTO DIFF WBC: CPT | Performed by: INTERNAL MEDICINE

## 2025-02-12 PROCEDURE — 36415 COLL VENOUS BLD VENIPUNCTURE: CPT | Performed by: INTERNAL MEDICINE

## 2025-02-12 PROCEDURE — A9577 INJ MULTIHANCE: HCPCS | Performed by: INTERNAL MEDICINE

## 2025-02-12 PROCEDURE — 63710000001 PREDNISONE PER 1 MG: Performed by: INTERNAL MEDICINE

## 2025-02-12 PROCEDURE — 80053 COMPREHEN METABOLIC PANEL: CPT | Performed by: INTERNAL MEDICINE

## 2025-02-12 PROCEDURE — 25510000002 GADOBENATE DIMEGLUMINE 529 MG/ML SOLUTION: Performed by: INTERNAL MEDICINE

## 2025-02-12 PROCEDURE — 25010000002 HEPARIN (PORCINE) PER 1000 UNITS: Performed by: INTERNAL MEDICINE

## 2025-02-12 PROCEDURE — 83880 ASSAY OF NATRIURETIC PEPTIDE: CPT | Performed by: INTERNAL MEDICINE

## 2025-02-12 PROCEDURE — 63710000001 PREDNISONE PER 5 MG: Performed by: INTERNAL MEDICINE

## 2025-02-12 RX ORDER — LABETALOL 100 MG/1
50 TABLET, FILM COATED ORAL EVERY 6 HOURS PRN
Status: DISCONTINUED | OUTPATIENT
Start: 2025-02-12 | End: 2025-02-14 | Stop reason: HOSPADM

## 2025-02-12 RX ADMIN — HEPARIN SODIUM 5000 UNITS: 5000 INJECTION INTRAVENOUS; SUBCUTANEOUS at 21:05

## 2025-02-12 RX ADMIN — COLCHICINE 0.6 MG: 0.6 TABLET ORAL at 21:05

## 2025-02-12 RX ADMIN — Medication 10 ML: at 09:41

## 2025-02-12 RX ADMIN — Medication 10 ML: at 21:06

## 2025-02-12 RX ADMIN — COLCHICINE 0.6 MG: 0.6 TABLET ORAL at 08:22

## 2025-02-12 RX ADMIN — PREDNISONE 50 MG: 10 TABLET ORAL at 08:22

## 2025-02-12 RX ADMIN — GADOBENATE DIMEGLUMINE 15 ML: 529 INJECTION, SOLUTION INTRAVENOUS at 10:23

## 2025-02-12 RX ADMIN — HEPARIN SODIUM 5000 UNITS: 5000 INJECTION INTRAVENOUS; SUBCUTANEOUS at 08:22

## 2025-02-12 NOTE — PROGRESS NOTES
Name: Jovon Knight ADMIT: 2025   : 1940  PCP: Pinky Ludwig APRN    MRN: [id number] LOS: 1 days   AGE/SEX: 84 y.o. male  ROOM: [facility]     Subjective   Subjective   He reports some relief in pain, but has been reluctant to try and stand due to the severe pain he had prior. He currently reports some continue pain but feels it has improved. He otherwise offers no complaints at this time, denies any chest pain, shortness of air, palpitations, lightheadedness, dizziness, fever, chills, cough, abdominal pain, nausea, vomiting, diarrhea.       Objective   Objective   Vital Signs  Temp:  [97.7 °F (36.5 °C)-98.1 °F (36.7 °C)] 97.7 °F (36.5 °C)  Heart Rate:  [51-66] 51  Resp:  [16-18] 18  BP: (116-165)/(62-88) 116/62  SpO2:  [97 %-100 %] 97 %  on   ;   Device (Oxygen Therapy): room air  Body mass index is 20.38 kg/m².  Physical Exam  Vitals and nursing note reviewed.   Constitutional:       General: He is awake. He is not in acute distress.     Appearance: He is well-developed.   HENT:      Head: Normocephalic and atraumatic.      Mouth/Throat:      Mouth: Mucous membranes are dry.      Pharynx: No oropharyngeal exudate.   Eyes:      General: No scleral icterus.     Conjunctiva/sclera: Conjunctivae normal.      Pupils: Pupils are equal, round, and reactive to light.   Neck:      Vascular: No JVD.   Cardiovascular:      Rate and Rhythm: Normal rate and regular rhythm.      Pulses: Normal pulses.      Heart sounds: Normal heart sounds. No murmur heard.  Pulmonary:      Effort: Pulmonary effort is normal. No respiratory distress.      Breath sounds: Normal breath sounds. No wheezing.   Abdominal:      General: Bowel sounds are normal. There is no distension.      Palpations: Abdomen is soft.      Tenderness: There is no abdominal tenderness.   Musculoskeletal:         General: Tenderness present.      Right lower leg: No edema.      Left lower leg: No edema.      Right foot: Swelling and tenderness  present.      Left foot: Swelling and tenderness present.      Comments: 1st metatarsal right foot erythematous, tender to touch  Second metatarsal left foot erythematous, tender to touch, left lateral foot erythema   No open wounds   Skin:     General: Skin is warm and dry.      Findings: Erythema present. No rash.   Neurological:      Mental Status: He is alert and oriented to person, place, and time. Mental status is at baseline.   Psychiatric:         Mood and Affect: Mood normal. Affect is flat.         Behavior: Behavior is cooperative.       Results Review     I reviewed the patient's new clinical results.  Results from last 7 days   Lab Units 02/12/25  0412 02/11/25  1111   WBC 10*3/mm3 9.62 10.53   HEMOGLOBIN g/dL 11.2* 14.1   PLATELETS 10*3/mm3 255 310     Results from last 7 days   Lab Units 02/12/25  0412 02/11/25  1111   SODIUM mmol/L 143 145   POTASSIUM mmol/L 4.3 4.8   CHLORIDE mmol/L 111* 111*   CO2 mmol/L 21.5* 21.0*   BUN mg/dL 41* 38*   CREATININE mg/dL 1.46* 1.39*   GLUCOSE mg/dL 145* 107*   EGFR mL/min/1.73 46.5* 49.4*     Results from last 7 days   Lab Units 02/12/25  0412 02/11/25  1111   ALBUMIN g/dL 2.8* 3.8   BILIRUBIN mg/dL 0.3 0.4   ALK PHOS U/L 88 115   AST (SGOT) U/L 14 17   ALT (SGPT) U/L 9 12     Results from last 7 days   Lab Units 02/12/25  0412 02/11/25  1111   CALCIUM mg/dL 8.7 9.7   ALBUMIN g/dL 2.8* 3.8       Hemoglobin A1C   Date/Time Value Ref Range Status   02/12/2025 0412 5.30 4.80 - 5.60 % Final       MRI Foot Left With & Without Contrast    Result Date: 2/12/2025  1. Inflammatory arthritis at the 2nd PIP joint where there is a large effusion with synovial thickening and surrounding abnormal bone marrow edema. Marginal erosions base of the 2nd middle phalanx. Findings may be related to gout arthropathy though septic arthritis with osteomyelitis is also in the differential diagnosis. 2. Inflammatory arthritis 1st MTP joint where there is joint effusion with synovitis and  surrounding abnormal bone marrow edema and enhancement. Marginal erosion medial aspect of the head of the 1st metatarsal. Findings may be related to gout arthropathy though septic arthritis with osteomyelitis is also in the differential diagnosis. 3. Thin fluid collection within the subcutaneous fat plantar to the head of the 5th metatarsal and 5th MTP joint. This is likely a chronic bursal collection. Infected bursal collection is possible. There is not a great deal of surrounding enhancement though this could be related to poorly vascularized tissue.  This report was finalized on 2/12/2025 11:10 AM by Srinivas Salazar M.D on Workstation: [id number]      XR Foot 3+ View Left    Result Date: 2/11/2025   As described.    This report was finalized on 2/11/2025 12:51 PM by Dr. Zeb Mart M.D on Workstation: [id number]      XR Foot 3+ View Right    Result Date: 2/11/2025   As described.    This report was finalized on 2/11/2025 12:51 PM by Dr. Zeb Mart M.D on Workstation: [id number]       I have personally reviewed all medications:  Scheduled Medications  colchicine, 0.6 mg, Oral, Q12H  heparin (porcine), 5,000 Units, Subcutaneous, Q12H  predniSONE, 50 mg, Oral, Daily With Breakfast  sodium chloride, 10 mL, Intravenous, Q12H    Infusions     Diet  Diet: Renal; Low Sodium (2-3g), Low Potassium, Low Phosphorus; Fluid Consistency: Thin (IDDSI 0)    I have personally reviewed:  [x]  Laboratory   [x]  Microbiology   [x]  Radiology   [x]  EKG/Telemetry  [x]  Cardiology/Vascular   []  Pathology    []  Records       Assessment/Plan     Active Hospital Problems    Diagnosis  POA    **Gout [M10.9]  Yes    Hyperuricemia [E79.0]  Unknown    CKD (chronic kidney disease) [N18.9]  Unknown    Colon cancer [C18.9]  Yes    Hyperlipidemia [E78.5]  Yes      Resolved Hospital Problems   No resolved problems to display.       84 y.o. male admitted with     Gout.  Hyperuricemia   MRI concerning form gouty arthritis versus  osteomyelitis-  Will ask podiatry to evaluate/ recommendations   Continue on colchicine, and prednisone  Elevated uric acid at time of admission  Elevated inflammatory markers at time of admission  Low purine diet recommended   Erythema, redness, and tenderness to first metatarsal right foot, erythema, redness, edema second metatarsal left foot left lateral foot erythematous, no open wounds/ulcers  He is reluctant to bear weight due to pain - was crawling to get to and from his bathroom at home.   PT- OT consulted     CKD (Chronic Kidney Disease)  Chronic   Likely baseline creatine - he does not follow up with healthcare providers on a regular basis   Monitor labs  Avoid nephrotoxins, hypovolemia, hypotension  Strict I's and O's  Daily weights            SCDs for DVT prophylaxis.  Full code.  Discussed with patient, nursing staff, and Dr Roman  .  Anticipate discharge  Home vs Acute rehab  in 1-2 days.  Expected discharge date/ time has not been documented.      BEATA Ag  [facility]  02/12/25  11:36 EST

## 2025-02-12 NOTE — PLAN OF CARE
Goal Outcome Evaluation:   Patient will remain free from falls while on this unit. Patient will be compliant with using his call light when he requires assistance.

## 2025-02-12 NOTE — CASE MANAGEMENT/SOCIAL WORK
Discharge Planning Assessment  [facility]     Patient Name: Jovon Knight  MRN: [id number]  Today's Date: 2/12/2025    Admit Date: 2/11/2025    Plan: TBD   Discharge Needs Assessment       Row Name 02/12/25 1618       Living Environment    People in Home alone    Current Living Arrangements home    Potentially Unsafe Housing Conditions none    In the past 12 months has the electric, gas, oil, or water company threatened to shut off services in your home? No    Primary Care Provided by self    Provides Primary Care For no one, unable/limited ability to care for self    Family Caregiver if Needed none    Quality of Family Relationships non-existent       Resource/Environmental Concerns    Resource/Environmental Concerns home accessibility    Home Accessibility Concerns stairs to access bedroom or bathroom       Transportation Needs    In the past 12 months, has lack of transportation kept you from medical appointments or from getting medications? no    In the past 12 months, has lack of transportation kept you from meetings, work, or from getting things needed for daily living? No       Food Insecurity    Within the past 12 months, you worried that your food would run out before you got the money to buy more. Never true    Within the past 12 months, the food you bought just didn't last and you didn't have money to get more. Never true       Transition Planning    Transportation Anticipated car, drives self       Discharge Needs Assessment    Readmission Within the Last 30 Days no previous admission in last 30 days    Equipment Currently Used at Home cane, straight;other (see comments)  compression leg wrap    Concerns to be Addressed discharge planning    Do you want help finding or keeping work or a job? I do not need or want help    Do you want help with school or training? For example, starting or completing job training or getting a high school diploma, GED or equivalent No    Current Discharge Risk lives  alone;lack of support system/caregiver;physical impairment                   Discharge Plan       Row Name 02/12/25 1620       Plan    Plan TBD    Plan Comments S/W pt at bedside.  Facesheet info confirmed.  Pt lives alone in a house with 14 steps to access his bedroom and primary bathroom.  Pt states his only family who lives locally is his son who had a stroke 6  months ago and is at [facility].  His only other family member is his 89 yr old sister who lives in [place], [place].   Home DME includes a cane and compression leg wraps.  He is able to drive and do his household chores at baseline.  He denies trouble affording meds, food, utilities.  Pt is concerned that he cannot ambulate right now an is not sure what to do.  CCP discussed DC options including SNF or HH.  Pt said he feels very overwhelmed at this time and does not want to discuss DC plans further at this time.  CCP will followup after PT eval to assist w/ DC plans as needed. ............Meeta WALLACE/ MARCO ANTONIO                  Continued Care and Services - Admitted Since 2/11/2025    No active coordination exists for this encounter.          Demographic Summary       Row Name 02/12/25 1618       General Information    Admission Type inpatient    Arrived From home    Required Notices Provided Important Message from Medicare    Referral Source admission list    Reason for Consult discharge planning    Preferred Language English                   Functional Status       Row Name 02/12/25 1618       Functional Status    Usual Activity Tolerance moderate       Functional Status, IADL    Medications independent    Meal Preparation independent    Housekeeping independent    Laundry independent    Shopping independent    If for any reason you need help with day-to-day activities such as bathing, preparing meals, shopping, managing finances, etc., do you get the help you need? I could use a little more help       Mental Status    General Appearance WDL WDL        Mental Status Summary    Recent Changes in Mental Status/Cognitive Functioning no changes       Employment/    Employment Status retired                             Meeta Lomeli RN

## 2025-02-12 NOTE — CONSULTS
Podiatry Consult Note      Patient: Jovon Knight Admit Date: 02/11/2025    Age: 84 y.o.   PCP: Pinky Ludwig APRN    MRN: [id number]  Room: [facility]        Subjective     Chief Complaint     Chief Complaint   Patient presents with    Foot Pain        HPI     This is AN 84-year-old male who presents with acute left and right first MTPJ pain over the last 1 to 2 weeks.  Patient denies any trauma to his feet.  He denies a history of diabetes and denies any open lesions.  He does have a history of gout that was previously treated with over-the-counter medication.  He has not had a flare in over 2 years.  He transitioned his diet which had been helping.    Past Medical History     Past Medical History:   Diagnosis Date    Allergic     Cancer     colon    Gout         Past Surgical History:   Procedure Laterality Date    CHOLECYSTECTOMY      COLON SURGERY      DENTAL PROCEDURE      during teens, 2 dental implants         No Known Allergies     Social History     Tobacco Use   Smoking Status Never   Smokeless Tobacco Never        Objective   Physical Exam    Vitals:    02/12/25 1124   BP: 147/65   Pulse: 59   Resp: 18   Temp: 97.7 °F (36.5 °C)   SpO2:         Dermatology:   Erythema, edema to the first MTPJ. No open sores, no open lesions.    Vascular:    DP and PT pulses are palpable    Neurological: light touch and protective sensation are intact    Musckuloskeletal: pain to palpation of first MTPJ left and right foot.  Pain with range of motion and guarding present.    Labs     Lab Results   Component Value Date    HGBA1C 5.30 02/12/2025    SEDRATE 82 (H) 02/11/2025        CBC:      Lab 02/12/25  0412 02/11/25  1111   WBC 9.62 10.53   HEMOGLOBIN 11.2* 14.1   HEMATOCRIT 33.2* 43.4   PLATELETS 255 310   NEUTROS ABS 8.49* 8.81*   IMMATURE GRANS (ABS) 0.04 0.04   LYMPHS ABS 0.60* 0.89   MONOS ABS 0.48 0.77   EOS ABS 0.00 0.00   MCV 89.5 91.9          No results found for this or any previous visit.     MRI Foot  Left With & Without Contrast  Narrative: MRI LEFT FOREFOOT WITH AND WITHOUT CONTRAST        HISTORY: Second toe pain with swelling and discoloration.     TECHNIQUE:  MRI includes coronal, short axis T1, T1 fat-sat, T2 fat-sat,  axial T1, STIR, sagittal PD fat-saturated sequences.  Contrast was  administered IV followed by axial, coronal, sagittal T1 fat-saturated  sequences.     COMPARISON: Left foot x-rays 02/11/2025.     FINDINGS: Hallux valgus deformity with chronic arthritic change at the  1st MTP joint. First MTP joint effusion. There is surrounding bone  marrow edema and enhancement within the head of the 1st metatarsal and  base of the 1st proximal phalanx. Marginal erosion within the medial  aspect of the head of the 1st metatarsal.     Second PIP joint space loss with effusion. Fluid signal extends lateral  to the 2nd PIP joint and lateral to the head of the 2nd proximal phalanx  and base of the 2nd  middle phalanx measuring 16 mm in AP dimension.  There is synovial/capsular thickening and enhancement. Small marginal  erosions within the base of the 2nd middle phalanx.     Mild bone marrow edema within the central and medial aspect of the head  of the 3rd proximal phalanx. Bone marrow signal within the 4th and 5th  toes and 2nd through 5th metatarsals is within normal limits. Midfoot  alignment appears normal and Lisfranc ligament is intact.     Thin fluid collection within the subcutaneous fat plantar to the head of  the 5th metatarsal and 5th MTP joint. Fluid collection measures 2.8 x  2.2 x 0.8 cm. No evidence for underlying septic arthritis at the 5th MTP  joint.     Impression: 1. Inflammatory arthritis at the 2nd PIP joint where there is a large  effusion with synovial thickening and surrounding abnormal bone marrow  edema. Marginal erosions base of the 2nd middle phalanx. Findings may be  related to gout arthropathy though septic arthritis with osteomyelitis  is also in the differential  diagnosis.  2. Inflammatory arthritis 1st MTP joint where there is joint effusion  with synovitis and surrounding abnormal bone marrow edema and  enhancement. Marginal erosion medial aspect of the head of the 1st  metatarsal. Findings may be related to gout arthropathy though septic  arthritis with osteomyelitis is also in the differential diagnosis.  3. Thin fluid collection within the subcutaneous fat plantar to the head  of the 5th metatarsal and 5th MTP joint. This is likely a chronic bursal  collection. Infected bursal collection is possible. There is not a great  deal of surrounding enhancement though this could be related to poorly  vascularized tissue.     This report was finalized on 2/12/2025 11:10 AM by Srinivas Salazar M.D  on Workstation: [id number]          Assessment/Plan       84-year-old male with acute on chronic gout attack of the first MTPJ left and right foot    -Patient examined and evaluated by myself  -no leukocytosis, uric acid elevated at 10.6.  - X-rays and MRIs performed and reviewed independently.  There is extra cortical erosions of the first MTPJ consistent with gout.  There is also calcifications on the vessels present.  - There is no open wound or source and therefore I do not think this is septic arthritis or osteomyelitis.  We could consider a joint aspiration if needed but until proven otherwise this is gouty arthropathy.  - Patient should continue steroids if tolerating well and can follow-up with me outpatient.  Discussed with Dr. Jessie Blanco DPM  [facility]  Office: [phone]

## 2025-02-13 PROBLEM — E44.0 MODERATE PROTEIN-CALORIE MALNUTRITION: Status: ACTIVE | Noted: 2025-02-13

## 2025-02-13 LAB
ANION GAP SERPL CALCULATED.3IONS-SCNC: 9.6 MMOL/L (ref 5–15)
BUN SERPL-MCNC: 39 MG/DL (ref 8–23)
BUN/CREAT SERPL: 26.9 (ref 7–25)
CALCIUM SPEC-SCNC: 8.8 MG/DL (ref 8.6–10.5)
CHLORIDE SERPL-SCNC: 117 MMOL/L (ref 98–107)
CO2 SERPL-SCNC: 20.4 MMOL/L (ref 22–29)
CREAT SERPL-MCNC: 1.45 MG/DL (ref 0.76–1.27)
DEPRECATED RDW RBC AUTO: 43.5 FL (ref 37–54)
DEPRECATED RDW RBC AUTO: 44.4 FL (ref 37–54)
EGFRCR SERPLBLD CKD-EPI 2021: 46.9 ML/MIN/1.73
ERYTHROCYTE [DISTWIDTH] IN BLOOD BY AUTOMATED COUNT: 13.3 % (ref 12.3–15.4)
ERYTHROCYTE [DISTWIDTH] IN BLOOD BY AUTOMATED COUNT: 13.6 % (ref 12.3–15.4)
GLUCOSE SERPL-MCNC: 101 MG/DL (ref 65–99)
HCT VFR BLD AUTO: 31.7 % (ref 37.5–51)
HCT VFR BLD AUTO: 34.3 % (ref 37.5–51)
HGB BLD-MCNC: 10.7 G/DL (ref 13–17.7)
HGB BLD-MCNC: 11.5 G/DL (ref 13–17.7)
MCH RBC QN AUTO: 30.3 PG (ref 26.6–33)
MCH RBC QN AUTO: 30.4 PG (ref 26.6–33)
MCHC RBC AUTO-ENTMCNC: 33.5 G/DL (ref 31.5–35.7)
MCHC RBC AUTO-ENTMCNC: 33.8 G/DL (ref 31.5–35.7)
MCV RBC AUTO: 90.1 FL (ref 79–97)
MCV RBC AUTO: 90.3 FL (ref 79–97)
PHOSPHATE SERPL-MCNC: 2.4 MG/DL (ref 2.5–4.5)
PLATELET # BLD AUTO: 247 10*3/MM3 (ref 140–450)
PLATELET # BLD AUTO: 260 10*3/MM3 (ref 140–450)
PMV BLD AUTO: 9.3 FL (ref 6–12)
PMV BLD AUTO: 9.6 FL (ref 6–12)
POTASSIUM SERPL-SCNC: 3.9 MMOL/L (ref 3.5–5.2)
RBC # BLD AUTO: 3.52 10*6/MM3 (ref 4.14–5.8)
RBC # BLD AUTO: 3.8 10*6/MM3 (ref 4.14–5.8)
SODIUM SERPL-SCNC: 147 MMOL/L (ref 136–145)
WBC NRBC COR # BLD AUTO: 11.48 10*3/MM3 (ref 3.4–10.8)
WBC NRBC COR # BLD AUTO: 9.67 10*3/MM3 (ref 3.4–10.8)

## 2025-02-13 PROCEDURE — 97161 PT EVAL LOW COMPLEX 20 MIN: CPT

## 2025-02-13 PROCEDURE — 85027 COMPLETE CBC AUTOMATED: CPT | Performed by: INTERNAL MEDICINE

## 2025-02-13 PROCEDURE — 85027 COMPLETE CBC AUTOMATED: CPT | Performed by: NURSE PRACTITIONER

## 2025-02-13 PROCEDURE — 80048 BASIC METABOLIC PNL TOTAL CA: CPT | Performed by: INTERNAL MEDICINE

## 2025-02-13 PROCEDURE — 63710000001 PREDNISONE PER 5 MG: Performed by: INTERNAL MEDICINE

## 2025-02-13 PROCEDURE — 25010000002 HEPARIN (PORCINE) PER 1000 UNITS: Performed by: INTERNAL MEDICINE

## 2025-02-13 PROCEDURE — 84100 ASSAY OF PHOSPHORUS: CPT

## 2025-02-13 PROCEDURE — 63710000001 PREDNISONE PER 1 MG: Performed by: INTERNAL MEDICINE

## 2025-02-13 RX ORDER — ACETAMINOPHEN 325 MG/1
650 TABLET ORAL 3 TIMES DAILY
Status: DISCONTINUED | OUTPATIENT
Start: 2025-02-13 | End: 2025-02-14 | Stop reason: HOSPADM

## 2025-02-13 RX ADMIN — Medication 10 ML: at 08:43

## 2025-02-13 RX ADMIN — COLCHICINE 0.6 MG: 0.6 TABLET ORAL at 20:14

## 2025-02-13 RX ADMIN — Medication 10 ML: at 20:15

## 2025-02-13 RX ADMIN — ACETAMINOPHEN 650 MG: 325 TABLET, FILM COATED ORAL at 20:14

## 2025-02-13 RX ADMIN — HEPARIN SODIUM 5000 UNITS: 5000 INJECTION INTRAVENOUS; SUBCUTANEOUS at 20:15

## 2025-02-13 RX ADMIN — ACETAMINOPHEN 650 MG: 325 TABLET, FILM COATED ORAL at 14:57

## 2025-02-13 RX ADMIN — HEPARIN SODIUM 5000 UNITS: 5000 INJECTION INTRAVENOUS; SUBCUTANEOUS at 08:38

## 2025-02-13 RX ADMIN — PREDNISONE 50 MG: 10 TABLET ORAL at 08:37

## 2025-02-13 RX ADMIN — COLCHICINE 0.6 MG: 0.6 TABLET ORAL at 08:38

## 2025-02-13 NOTE — PROGRESS NOTES
Name: Jovon Knight ADMIT: 2025   : 1940  PCP: Pinky Ludwig APRN    MRN: [id number] LOS: 2 days   AGE/SEX: 84 y.o. male  ROOM: [facility]     Subjective   Subjective   He is resting in bed, he is feeling discouraged today, was hoping he would have seen more improvement today. He still has consistent pain bilateral feet. he has not stood up since arrival, due to fear of pain., but is agreeable to attempt ambulating with PT. He otherwise denies any acute distress.       Objective   Objective   Vital Signs  Temp:  [97.3 °F (36.3 °C)-98.2 °F (36.8 °C)] 97.3 °F (36.3 °C)  Heart Rate:  [51-60] 60  Resp:  [18] 18  BP: (113-146)/(66-70) 144/70  SpO2:  [98 %-99 %] 98 %  on   ;   Device (Oxygen Therapy): room air  Body mass index is 20.38 kg/m².  Physical Exam  Vitals and nursing note reviewed.   Constitutional:       General: He is awake. He is not in acute distress.     Appearance: He is well-developed.   HENT:      Head: Normocephalic and atraumatic.      Mouth/Throat:      Mouth: Mucous membranes are dry.      Pharynx: No oropharyngeal exudate.   Eyes:      General: No scleral icterus.     Conjunctiva/sclera: Conjunctivae normal.      Pupils: Pupils are equal, round, and reactive to light.   Neck:      Vascular: No JVD.   Cardiovascular:      Rate and Rhythm: Normal rate and regular rhythm.      Pulses: Normal pulses.      Heart sounds: Normal heart sounds. No murmur heard.  Pulmonary:      Effort: Pulmonary effort is normal. No respiratory distress.      Breath sounds: Normal breath sounds. No wheezing.   Abdominal:      General: Bowel sounds are normal. There is no distension.      Palpations: Abdomen is soft.      Tenderness: There is no abdominal tenderness.   Musculoskeletal:         General: Tenderness present.      Right lower leg: No edema.      Left lower leg: No edema.      Right foot: Swelling and tenderness present.      Left foot: Swelling and tenderness present.      Comments: 1st  metatarsal right foot erythematous, tender to touch  Second metatarsal left foot erythematous, tender to touch, left lateral foot erythema   No open wounds   Skin:     General: Skin is warm and dry.      Findings: Erythema present. No rash.   Neurological:      Mental Status: He is alert and oriented to person, place, and time. Mental status is at baseline.   Psychiatric:         Mood and Affect: Mood normal. Affect is flat.         Behavior: Behavior is cooperative.       Results Review     I reviewed the patient's new clinical results.  Results from last 7 days   Lab Units 02/13/25 0524 02/12/25 0412 02/11/25  1111   WBC 10*3/mm3 11.48* 9.62 10.53   HEMOGLOBIN g/dL 10.7* 11.2* 14.1   PLATELETS 10*3/mm3 247 255 310     Results from last 7 days   Lab Units 02/13/25 0524 02/12/25 0412 02/11/25  1111   SODIUM mmol/L 147* 143 145   POTASSIUM mmol/L 3.9 4.3 4.8   CHLORIDE mmol/L 117* 111* 111*   CO2 mmol/L 20.4* 21.5* 21.0*   BUN mg/dL 39* 41* 38*   CREATININE mg/dL 1.45* 1.46* 1.39*   GLUCOSE mg/dL 101* 145* 107*   EGFR mL/min/1.73 46.9* 46.5* 49.4*     Results from last 7 days   Lab Units 02/12/25 0412 02/11/25  1111   ALBUMIN g/dL 2.8* 3.8   BILIRUBIN mg/dL 0.3 0.4   ALK PHOS U/L 88 115   AST (SGOT) U/L 14 17   ALT (SGPT) U/L 9 12     Results from last 7 days   Lab Units 02/13/25 0524 02/12/25 0412 02/11/25  1111   CALCIUM mg/dL 8.8 8.7 9.7   ALBUMIN g/dL  --  2.8* 3.8   PHOSPHORUS mg/dL 2.4*  --   --        Hemoglobin A1C   Date/Time Value Ref Range Status   02/12/2025 0412 5.30 4.80 - 5.60 % Final       MRI Foot Left With & Without Contrast    Result Date: 2/12/2025  1. Inflammatory arthritis at the 2nd PIP joint where there is a large effusion with synovial thickening and surrounding abnormal bone marrow edema. Marginal erosions base of the 2nd middle phalanx. Findings may be related to gout arthropathy though septic arthritis with osteomyelitis is also in the differential diagnosis. 2. Inflammatory  arthritis 1st MTP joint where there is joint effusion with synovitis and surrounding abnormal bone marrow edema and enhancement. Marginal erosion medial aspect of the head of the 1st metatarsal. Findings may be related to gout arthropathy though septic arthritis with osteomyelitis is also in the differential diagnosis. 3. Thin fluid collection within the subcutaneous fat plantar to the head of the 5th metatarsal and 5th MTP joint. This is likely a chronic bursal collection. Infected bursal collection is possible. There is not a great deal of surrounding enhancement though this could be related to poorly vascularized tissue.  This report was finalized on 2/12/2025 11:10 AM by Srinivas Salazar M.D on Workstation: [id number]       I have personally reviewed all medications:  Scheduled Medications  colchicine, 0.6 mg, Oral, Q12H  heparin (porcine), 5,000 Units, Subcutaneous, Q12H  predniSONE, 50 mg, Oral, Daily With Breakfast  sodium chloride, 10 mL, Intravenous, Q12H    Infusions     Diet  Diet: Regular/House; Fluid Consistency: Thin (IDDSI 0)    I have personally reviewed:  [x]  Laboratory   [x]  Microbiology   [x]  Radiology   [x]  EKG/Telemetry  [x]  Cardiology/Vascular   []  Pathology    []  Records       Assessment/Plan     Active Hospital Problems    Diagnosis  POA    **Gout [M10.9]  Yes    Moderate protein-calorie malnutrition [E44.0]  Yes    Hyperuricemia [E79.0]  Unknown    CKD (chronic kidney disease) [N18.9]  Unknown    Colon cancer [C18.9]  Yes    Hyperlipidemia [E78.5]  Yes      Resolved Hospital Problems   No resolved problems to display.       84 y.o. male admitted with     Gout.  Hyperuricemia   MRI concerning form gouty arthritis versus osteomyelitis-   Podiatry following appreciate their assistance and recommendations  Podiatry after reviewing x-ray and MRI reports suspect next her cortical erosions of the first MTPJ consistent with gout, with vessel calcifications.  Not osteomyelitis.  Recommend no  intervention at this time, but would consider aspiration if necessary.  Continue on colchicine, and prednisone  Elevated uric acid at time of admission  Elevated inflammatory markers at time of admission  Low purine diet recommended   Erythema, redness, and tenderness to first metatarsal right foot, erythema, redness, edema second metatarsal left foot left lateral foot erythematous, no open wounds/ulcers  He is reluctant to bear weight due to pain - was crawling to get to and from his bathroom at home.   PT- OT consulted   He is agreeable to acute rehab at time of discharge-CCP is following, and coordinating for possible placement.    CKD (Chronic Kidney Disease)  Chronic   Creatinine remained stable   He does not follow-up any providers on a regular basis   Monitor labs  Avoid nephrotoxins, hypovolemia, hypotension  Strict I's and O's  Daily weights  Phosphorus 2.4-replace per protocol          SCDs for DVT prophylaxis.  Full code.  Discussed with patient, nursing staff, and Dr Roman  .  Anticipate discharge  Home vs Acute rehab  in 1-2 days.  Expected Discharge Date: 2/14/2025; Expected Discharge Time:       BEATA Ag  [facility]  02/13/25  13:57 EST

## 2025-02-13 NOTE — PLAN OF CARE
Goal Outcome Evaluation:      Resting in bed, up with PT and walker, upset because the bed alarm is set. Educated pt on risks of falling, and he needs to call for assistance prior to getting up. A/ox4, RA, PIV patent. NAD noted.

## 2025-02-13 NOTE — PLAN OF CARE
Goal Outcome Evaluation:  Plan of Care Reviewed With: patient           Outcome Evaluation: Patient 84-year-old male presented to ED with bilateral foot pain noted gout.  Patient reports baseline he is independent he does not use DME he has a flight of steps to get up to his master bedroom.  On PT exam patient reports right foot pain (improved on L).  Patient stood bedside contact-guard using rolling walker verbal cues for mechanics to use walker step to pattern, antalgic quality.  Only able to take a few steps bedside contact-guard walker.  Patient may benefit from skilled PT services to address functional deficits, anticipate SNU at discharge as patient is not at baseline and lives alone.    Anticipated Discharge Disposition (PT): skilled nursing facility

## 2025-02-13 NOTE — DISCHARGE PLACEMENT REQUEST
"Jeremy Knight (84 y.o. Male)       Date of Birth   1940    Social Security Number       Address   [address] [place] [address]    Home Phone   [phone]    MRN   [id number]       [employer]   [employer]    Marital Status                               Admission Date   2/11/25    Admission Type   Emergency    Admitting Provider   Marci Curtis MD    Attending Provider   Jarod Roman MD    Department, Room/Bed   [address], S[address]/1       Discharge Date       Discharge Disposition       Discharge Destination                                 Attending Provider: Jarod Roman MD    Allergies: No Known Allergies    Isolation: None   Infection: None   Code Status: CPR    Ht: 172.7 cm (68\")   Wt: 60.8 kg (134 lb 0.6 oz)    Admission Cmt: None   Principal Problem: Gout [M10.9]                   Active Insurance as of 2/11/2025       Primary Coverage       Payor Plan Insurance Group Employer/Plan Group    MEDICARE MEDICARE A & B        Payor Plan Address Payor Plan Phone Number Payor Plan Fax Number Effective Dates    [address] [phone]  3/1/2003 - None Entered    [facility] [id number]         Subscriber Name Subscriber Birth Date Member ID       JEREMY KNIGHT 1940 [id number]               Secondary Coverage       Payor Plan Insurance Group Employer/Plan Group    [id number]       Payor Plan Address Payor Plan Phone Number Payor Plan Fax Number Effective Dates    [address]   12/1/2016 - None Entered    [facility] [id number]         Subscriber Name Subscriber Birth Date Member ID       JEREMY KNIGHT 1940 [id number]                     Emergency Contacts        (Rel.) Home Phone Work Phone Mobile Phone    Bj Knight (Son) -- -- [phone]                "

## 2025-02-13 NOTE — CASE MANAGEMENT/SOCIAL WORK
Continued Stay Note  [facility]     Patient Name: Jovon Knight  MRN: [id number]  Today's Date: 2/13/2025    Admit Date: 2/11/2025    Plan: SNF   Discharge Plan       Row Name 02/13/25 1151       Plan    Plan SNF    Plan Comments Dr Roman has spoken with pt and recommended SNF upon DC.  CCP spoke w/ pt who is in agreement.  CMS Compare info from Medicare.gov given and discussed.  Pt prefers to find a facility in the [facility].  Per his permission, ref[facility] sent to [facility], [facility] and [facility].  Evals pending.  Pt will need transport arranged upon DC. ............Meeta WALLACE/ [facility]CO ANTONIO                   Discharge Codes    No documentation.                 Expected Discharge Date and Time       Expected Discharge Date Expected Discharge Time    Feb 14, 2025               Meeta Lomeli RN

## 2025-02-13 NOTE — CONSULTS
"Nutrition Services    Patient Name:  Jovon Knight  YOB: 1940  MRN: [id number]  Admit Date:  2/11/2025    Assessment Date:  02/13/25    NUTRITION SCREENING      Reason for Encounter Diet education   Diagnosis/Problem Hx: colon cancer, CKD, gout    Pt admitted to [facility] with bilateral foot pain. Elevated uric acid level. RD visited pt at bedside. Pt aware of foods to avoid r/t hx gout after he drank beer and ate shellfish one evening - RD reviewed diet education materials, encouraging water intake, vitamin C sources, and foods to avoid (shellfish, red meat, foods high in high fructose corn syrup). Pt now avoids shellfish and drinks NA beer. Pt generally eats chicken but did eat beef just before this pain initiated. Pt provided detailed hx about the passing of his wife ~14 years ago, pt's son living in a nursing facility and their edgra relationship, background in mathematics. Pt eats 1 small meal and 1 decent sized meal daily + snacks on yogurt or applesauce. Pt does not drink ONS. NFPE completed, consistent with nutrition diagnosis of malnutrition using AND/ASPEN criteria. See MSA below.          PO Diet Diet: Regular/House; Fluid Consistency: Thin (IDDSI 0)   Supplements n/a   PO Intake % No intake recorded        Medications MAR reviewed by RD   Labs  Listed below, reviewed   Physical Findings No documented edema    Room air   GI Function + BM 2/10   Skin Status Intact        Height  Weight  BMI  Weight Trend     Height: 172.7 cm (68\")  Weight: 60.8 kg (134 lb 0.6 oz) (02/12/25 0717)  Body mass index is 20.38 kg/m².  Unknown no recent wt hx to review        Nutrition Problem (PES) Moderate social or environmental circumstances related malnutrition related to lack of appetite in the setting of grief and loss as evidenced by PO intake meeting less than 75% of estimated energy requirement for greater than or equal to 3 months and overall moderate muscle wasting per NFPE.        Intervention/Plan Remove " renal diet restrictions. K+ WNL, phosphorus low, and pt seemingly dehydrated + pt meeting criteria for malnutrition.     RD to follow up per protocol.     Results from last 7 days   Lab Units 02/13/25  0524 02/12/25  0412 02/11/25  1111   SODIUM mmol/L 147* 143 145   POTASSIUM mmol/L 3.9 4.3 4.8   CHLORIDE mmol/L 117* 111* 111*   CO2 mmol/L 20.4* 21.5* 21.0*   BUN mg/dL 39* 41* 38*   CREATININE mg/dL 1.45* 1.46* 1.39*   CALCIUM mg/dL 8.8 8.7 9.7   BILIRUBIN mg/dL  --  0.3 0.4   ALK PHOS U/L  --  88 115   ALT (SGPT) U/L  --  9 12   AST (SGOT) U/L  --  14 17   GLUCOSE mg/dL 101* 145* 107*     Results from last 7 days   Lab Units 02/13/25  0524   PHOSPHORUS mg/dL 2.4*   HEMOGLOBIN g/dL 10.7*   HEMATOCRIT % 31.7*     Lab Results   Component Value Date    HGBA1C 5.30 02/12/2025     Malnutrition Severity Assessment      Patient meets criteria for : Moderate (non-severe) Malnutrition  Malnutrition Type (Last 8 Hours)       Malnutrition Severity Assessment       Row Name 02/13/25 1147       Malnutrition Severity Assessment    Malnutrition Type Starvation - Related Malnutrition      Row Name 02/13/25 1147       Insufficient Energy Intake     Insufficient Energy Intake Findings Moderate    Insufficient Energy Intake  <75% of est. energy requirement for > or equal to 3 months      Row Name 02/13/25 1147       Muscle Loss    Loss of Muscle Mass Findings Moderate    Clavicle Bone Region Moderate - some protrusion in females, visible in males    Acromion Bone Region Moderate - acromion may slightly protrude    Dorsal Hand Region Moderate - slight depression    Patellar Region Severe - prominent bone, square looking, very little muscle definition    Anterior Thigh Region Severe - line/depression along thigh, obviously thin    Posterior Calf Region Severe - thin with very little definition/firmness      Row Name 02/13/25 1147       Fat Loss    Upper Arm Region Moderate - some fat tissue, not ample      Row Name 02/13/25 1147        Criteria Met (Must meet criteria for severity in at least 2 of these categories: M Wasting, Fat Loss, Fluid, Secondary Signs, Wt. Status, Intake)    Patient meets criteria for  Moderate (non-severe) Malnutrition                       Electronically signed by:  Xiomara Dillard RD  02/13/25 11:28 EST

## 2025-02-13 NOTE — THERAPY EVALUATION
Acute Care - Physical Therapy Initial Evaluation  [facility]     Patient Name: Jovon Knight  : 1940  MRN: [id number]  Today's Date: 2025      Visit Dx:     ICD-10-CM ICD-9-CM   1. Gout of both feet  M10.9 274.9   2. Osteomyelitis of second toe of left foot  M86.9 730.27     Patient Active Problem List   Diagnosis    Colon cancer    Hyperlipidemia    Basal cell carcinoma (BCC)    Gout    Hyperuricemia    CKD (chronic kidney disease)    Moderate protein-calorie malnutrition     Past Medical History:   Diagnosis Date    Allergic     Cancer     colon    Gout      Past Surgical History:   Procedure Laterality Date    CHOLECYSTECTOMY      COLON SURGERY      DENTAL PROCEDURE      during teens, 2 dental implants      PT Assessment (Last 12 Hours)       PT Evaluation and Treatment       Row Name 25 1400          Physical Therapy Time and Intention    Subjective Information complains of;pain  -     Document Type evaluation  -     Mode of Treatment individual therapy;physical therapy  -     Patient Effort good  -       Row Name 25 1400          General Information    Patient Profile Reviewed yes  -     Patient Observations alert;cooperative;agree to therapy  -     Patient/Family/Caregiver Comments/Observations Patient supine in bed no acute distress  -     Prior Level of Function independent:  -     Equipment Currently Used at Home none  -     Pertinent History of Current Functional Problem Bilateral foot pain  -     Existing Precautions/Restrictions fall  -     Benefits Reviewed patient:  -       Row Name 25 1400          Living Environment    Current Living Arrangements home  -     People in Home alone  -       Row Name 25 1400          Pain    Pre/Posttreatment Pain Comment Patient does not quantify however reports bilateral foot pain right greater than left  -       Row Name 25 1400          Cognition    Affect/Mental Status (Cognition) [facility]  -        [facility] Name 02/13/25 1400          Range of Motion Comprehensive    General Range of Motion bilateral lower extremity ROM WFL  -LH       Row Name 02/13/25 1400          Strength (Manual Muscle Testing)    Strength (Manual Muscle Testing) strength is WFL;bilateral lower extremities  -LH       Row Name 02/13/25 1400          Bed Mobility    Bed Mobility supine-sit;sit-supine  -     Supine-Sit Rogers (Bed Mobility) standby assist  -     Sit-Supine Rogers (Bed Mobility) standby assist  -     Assistive Device (Bed Mobility) bed rails;head of bed elevated  -LH       Row Name 02/13/25 1400          Transfers    Transfers stand-sit transfer;sit-stand transfer  -LH       Row Name 02/13/25 1400          Sit-Stand Transfer    Sit-Stand Rogers (Transfers) contact guard;verbal cues  -     Assistive Device (Sit-Stand Transfers) walker, front-wheeled  -LH       Row Name 02/13/25 1400          Stand-Sit Transfer    Stand-Sit Rogers (Transfers) standby assist;contact guard;verbal cues  -     Assistive Device (Stand-Sit Transfers) walker, front-wheeled  -LH       Row Name 02/13/25 1400          Gait/Stairs (Locomotion)    Rogers Level (Gait) verbal cues;nonverbal cues (demo/gesture);contact guard  -     Assistive Device (Gait) walker, front-wheeled  -     Distance in Feet (Gait) 3  Forward and back, lateral sidestepping  -     Pattern (Gait) step-to  -     Deviations/Abnormal Patterns (Gait) right sided deviations;weight shifting decreased;antalgic;karl decreased  -     Bilateral Gait Deviations forward flexed posture;heel strike decreased  -LH       Row Name 02/13/25 1400          Balance    Balance Assessment sitting static balance;standing static balance  -     Static Sitting Balance independent  -     Position, Sitting Balance unsupported;sitting edge of bed  -     Static Standing Balance contact guard  -     Position/Device Used, Standing Balance supported;walker,  front-wheeled  -[facility] Name 02/13/25 1400          Motor Skills    Therapeutic Exercise --  Ankle pumps, LAQ's x 10  -[facility] Name 02/13/25 1400          Plan of Care Review    Plan of Care Reviewed With patient  -     Outcome Evaluation Patient 84-year-old male presented to ED with bilateral foot pain noted gout.  Patient reports baseline he is independent he does not use DME he has a flight of steps to get up to his master bedroom.  On PT exam patient reports right foot pain (improved on L).  Patient stood bedside contact-guard using rolling walker verbal cues for mechanics to use walker step to pattern, antalgic quality.  Only able to take a few steps bedside contact-guard walker.  Patient may benefit from skilled PT services to address functional deficits, anticipate SNU at discharge as patient is not at baseline and lives alone.  -[facility] Name 02/13/25 1400          Positioning and Restraints    Pre-Treatment Position in bed  -     Post Treatment Position bed  -     In Bed supine;notified nsg;call light within reach;encouraged to call for assist;exit alarm on  -[facility] Name 02/13/25 1400          Therapy Assessment/Plan (PT)    Rehab Potential (PT) good  -     Criteria for Skilled Interventions Met (PT) yes  -     Therapy Frequency (PT) 5 times/wk  -[facility] Name 02/13/25 1400          PT Evaluation Complexity    History, PT Evaluation Complexity 1-2 personal factors and/or comorbidities  -     Examination of Body Systems (PT Eval Complexity) total of 3 or more elements  -     Clinical Presentation (PT Evaluation Complexity) evolving  -     Clinical Decision Making (PT Evaluation Complexity) low complexity  -     Overall Complexity (PT Evaluation Complexity) low complexity  -[facility] Name 02/13/25 1400          Physical Therapy Goals    Gait Training Goal Selection (PT) gait training, PT goal 1  -[facility] Name 02/13/25 1400          Gait Training Goal 1 (PT)     Activity/Assistive Device (Gait Training Goal 1, PT) gait (walking locomotion);walker, rolling  -     [place] Level (Gait Training Goal 1, PT) modified independence  -     Distance (Gait Training Goal 1, PT) 150  -     Time Frame (Gait Training Goal 1, PT) 1 week  -               User Key  (r) = Recorded By, (t) = Taken By, (c) = Cosigned By      Initials Name Provider Type     Shalonda Wan, PT Physical Therapist                    Physical Therapy Education       Title: PT OT SLP Therapies (Done)       Topic: Physical Therapy (Done)       Point: Mobility training (Done)       Learning Progress Summary            Patient Acceptance, E, VU,NR,DU by  at 2/13/2025 1501                      Point: Home exercise program (Done)       Learning Progress Summary            Patient Acceptance, E, VU,NR,DU by  at 2/13/2025 1501                      Point: Body mechanics (Done)       Learning Progress Summary            Patient Acceptance, E, VU,NR,DU by  at 2/13/2025 1501                      Point: Precautions (Done)       Learning Progress Summary            Patient Acceptance, E, VU,NR,DU by  at 2/13/2025 1501                                      User Key       Initials Effective Dates Name Provider Type Discipline     06/16/21 -  Shalonda Wan, PT Physical Therapist PT                  PT Recommendation and Plan  Anticipated Discharge Disposition (PT): skilled nursing facility  Planned Therapy Interventions (PT): balance training, bed mobility training, gait training, home exercise program, ROM (range of motion), stair training, strengthening, stretching, transfer training  Therapy Frequency (PT): 5 times/wk  Plan of Care Reviewed With: patient  Outcome Evaluation: Patient 84-year-old male presented to ED with bilateral foot pain noted gout.  Patient reports baseline he is independent he does not use DME he has a flight of steps to get up to his master bedroom.  On PT exam patient reports right  foot pain (improved on L).  Patient stood bedside contact-guard using rolling walker verbal cues for mechanics to use walker step to pattern, antalgic quality.  Only able to take a few steps bedside contact-guard walker.  Patient may benefit from skilled PT services to address functional deficits, anticipate SNU at discharge as patient is not at baseline and lives alone.   Outcome Measures       Row Name 02/13/25 1500             How much help from another person do you currently need...    Turning from your back to your side while in flat bed without using bedrails? 3  -LH      Moving from lying on back to sitting on the side of a flat bed without bedrails? 3  -LH      Moving to and from a bed to a chair (including a wheelchair)? 3  -LH      Standing up from a chair using your arms (e.g., wheelchair, bedside chair)? 3  -LH      Climbing 3-5 steps with a railing? 3  -LH      To walk in hospital room? 3  -LH      AM-PAC 6 Clicks Score (PT) 18  -         Functional Assessment    Outcome Measure Options AM-PAC 6 Clicks Basic Mobility (PT)  -                User Key  (r) = Recorded By, (t) = Taken By, (c) = Cosigned By      Initials Name Provider Type     Shalonda Wan, PT Physical Therapist                     Time Calculation:    PT Charges       Row Name 02/13/25 1501             Time Calculation    Start Time 1330  -      Stop Time 1344  -      Time Calculation (min) 14 min  -      PT Received On 02/13/25  -      PT - Next Appointment 02/14/25  -      PT Goal Re-Cert Due Date 02/20/25  -                User Key  (r) = Recorded By, (t) = Taken By, (c) = Cosigned By      Initials Name Provider Type     Shalonda Wan, PT Physical Therapist                  Therapy Charges for Today       Code Description Service Date Service Provider Modifiers Qty    [id number] HC PT EVAL LOW COMPLEXITY 3 2/13/2025 Shalonda Wan, PT GP 1            PT G-Codes  Outcome Measure Options: AM-PAC 6 Clicks Basic Mobility  (PT)  -[facility] 6 Clicks Score (PT): 18    Shalonda Wan, PT  2/13/2025

## 2025-02-13 NOTE — PLAN OF CARE
Problem: Adult Inpatient Plan of Care  Goal: Plan of Care Review  Outcome: Progressing  Flowsheets (Taken 2/13/2025 0541)  Progress: no change  Plan of Care Reviewed With: patient  Goal: Patient-Specific Goal (Individualized)  Outcome: Progressing  Goal: Absence of Hospital-Acquired Illness or Injury  Outcome: Progressing  Intervention: Identify and Manage Fall Risk  Recent Flowsheet Documentation  Taken 2/13/2025 0414 by Enriqueta Reilly, RN  Safety Promotion/Fall Prevention:   activity supervised   clutter free environment maintained   fall prevention program maintained   nonskid shoes/slippers when out of bed   room organization consistent   safety round/check completed  Taken 2/13/2025 0217 by Enriqueta Reilly, RN  Safety Promotion/Fall Prevention:   activity supervised   clutter free environment maintained   fall prevention program maintained   nonskid shoes/slippers when out of bed   room organization consistent   safety round/check completed  Taken 2/13/2025 0016 by Enriqueta Reilly, RN  Safety Promotion/Fall Prevention:   activity supervised   assistive device/personal items within reach   clutter free environment maintained   elopement precautions   nonskid shoes/slippers when out of bed   safety round/check completed  Taken 2/12/2025 2207 by Enriqueta Reilly, RN  Safety Promotion/Fall Prevention:   activity supervised   clutter free environment maintained   fall prevention program maintained   nonskid shoes/slippers when out of bed   room organization consistent   safety round/check completed  Taken 2/12/2025 2050 by Enriqueta Reilly, RN  Safety Promotion/Fall Prevention:   activity supervised   assistive device/personal items within reach   clutter free environment maintained   fall prevention program maintained   nonskid shoes/slippers when out of bed   room organization consistent   safety round/check completed  Intervention: Prevent Skin Injury  Recent Flowsheet Documentation  Taken  2/13/2025 0016 by Enriqueta Reilly RN  Body Position: position changed independently  Taken 2/12/2025 2050 by Enriqueta Reilly RN  Body Position: position changed independently  Intervention: Prevent and Manage VTE (Venous Thromboembolism) Risk  Recent Flowsheet Documentation  Taken 2/13/2025 0016 by Enriqueta Reilly RN  VTE Prevention/Management: SCDs (sequential compression devices) off  Taken 2/12/2025 2050 by Enriqueta Reilly RN  VTE Prevention/Management: SCDs (sequential compression devices) off  Intervention: Prevent Infection  Recent Flowsheet Documentation  Taken 2/13/2025 0414 by Enriqueta Reilly RN  Infection Prevention:   rest/sleep promoted   hand hygiene promoted   single patient room provided  Taken 2/13/2025 0217 by Enriqueta Reilly RN  Infection Prevention:   rest/sleep promoted   hand hygiene promoted   single patient room provided  Taken 2/13/2025 0016 by Enriqueta Reilly RN  Infection Prevention: hand hygiene promoted  Taken 2/12/2025 2207 by Enriqueta Reilly RN  Infection Prevention:   rest/sleep promoted   hand hygiene promoted   single patient room provided  Taken 2/12/2025 2050 by Enriqueta Reilly RN  Infection Prevention:   hand hygiene promoted   rest/sleep promoted   single patient room provided  Goal: Optimal Comfort and Wellbeing  Outcome: Progressing  Intervention: Monitor Pain and Promote Comfort  Recent Flowsheet Documentation  Taken 2/12/2025 2050 by Enriqueta Reilly RN  Pain Management Interventions:   pain management plan reviewed with patient/caregiver   no interventions per patient request   relaxation techniques promoted   quiet environment facilitated  Intervention: Provide Person-Centered Care  Recent Flowsheet Documentation  Taken 2/13/2025 0016 by Enriqueta Reilly RN  Trust Relationship/Rapport: care explained  Taken 2/12/2025 2050 by Enriqueta Reilly RN  Trust Relationship/Rapport: care explained  Goal: Readiness for  Transition of Care  Outcome: Progressing     Problem: Fall Injury Risk  Goal: Absence of Fall and Fall-Related Injury  Outcome: Progressing  Intervention: Identify and Manage Contributors  Recent Flowsheet Documentation  Taken 2/13/2025 0016 by Enriqueta Reilly, RN  Medication Review/Management: medications reviewed  Self-Care Promotion: independence encouraged  Taken 2/12/2025 2050 by Enriqueta Reilly, RN  Medication Review/Management: medications reviewed  Self-Care Promotion: independence encouraged  Intervention: Promote Injury-Free Environment  Recent Flowsheet Documentation  Taken 2/13/2025 0414 by Enriqueta Reilly, RN  Safety Promotion/Fall Prevention:   activity supervised   clutter free environment maintained   fall prevention program maintained   nonskid shoes/slippers when out of bed   room organization consistent   safety round/check completed  Taken 2/13/2025 0217 by Enriqueta Reilly, RN  Safety Promotion/Fall Prevention:   activity supervised   clutter free environment maintained   fall prevention program maintained   nonskid shoes/slippers when out of bed   room organization consistent   safety round/check completed  Taken 2/13/2025 0016 by Enriqueta Reilly, RN  Safety Promotion/Fall Prevention:   activity supervised   assistive device/personal items within reach   clutter free environment maintained   elopement precautions   nonskid shoes/slippers when out of bed   safety round/check completed  Taken 2/12/2025 2207 by Enriqueta Reilly, RN  Safety Promotion/Fall Prevention:   activity supervised   clutter free environment maintained   fall prevention program maintained   nonskid shoes/slippers when out of bed   room organization consistent   safety round/check completed  Taken 2/12/2025 2050 by Enriqueta Reilly, RN  Safety Promotion/Fall Prevention:   activity supervised   assistive device/personal items within reach   clutter free environment maintained   fall prevention program  maintained   nonskid shoes/slippers when out of bed   room organization consistent   safety round/check completed   Goal Outcome Evaluation:  Plan of Care Reviewed With: patient        Progress: no change

## 2025-02-14 VITALS
OXYGEN SATURATION: 98 % | WEIGHT: 134.04 LBS | TEMPERATURE: 97.9 F | BODY MASS INDEX: 20.31 KG/M2 | HEART RATE: 57 BPM | SYSTOLIC BLOOD PRESSURE: 158 MMHG | DIASTOLIC BLOOD PRESSURE: 74 MMHG | RESPIRATION RATE: 16 BRPM | HEIGHT: 68 IN

## 2025-02-14 PROBLEM — M79.671 FOOT PAIN, BILATERAL: Status: ACTIVE | Noted: 2025-02-14

## 2025-02-14 PROBLEM — R53.81 PHYSICAL DEBILITY: Status: ACTIVE | Noted: 2025-02-14

## 2025-02-14 PROBLEM — M79.672 FOOT PAIN, BILATERAL: Status: ACTIVE | Noted: 2025-02-14

## 2025-02-14 PROBLEM — M86.9 OSTEOMYELITIS OF SECOND TOE OF LEFT FOOT: Status: ACTIVE | Noted: 2025-02-14

## 2025-02-14 LAB
ANION GAP SERPL CALCULATED.3IONS-SCNC: 10.5 MMOL/L (ref 5–15)
BUN SERPL-MCNC: 38 MG/DL (ref 8–23)
BUN/CREAT SERPL: 27.3 (ref 7–25)
CALCIUM SPEC-SCNC: 8.7 MG/DL (ref 8.6–10.5)
CHLORIDE SERPL-SCNC: 113 MMOL/L (ref 98–107)
CO2 SERPL-SCNC: 18.5 MMOL/L (ref 22–29)
CREAT SERPL-MCNC: 1.39 MG/DL (ref 0.76–1.27)
DEPRECATED RDW RBC AUTO: 43.2 FL (ref 37–54)
EGFRCR SERPLBLD CKD-EPI 2021: 49.4 ML/MIN/1.73
ERYTHROCYTE [DISTWIDTH] IN BLOOD BY AUTOMATED COUNT: 13.4 % (ref 12.3–15.4)
GLUCOSE SERPL-MCNC: 111 MG/DL (ref 65–99)
HCT VFR BLD AUTO: 33.6 % (ref 37.5–51)
HGB BLD-MCNC: 11.4 G/DL (ref 13–17.7)
MCH RBC QN AUTO: 30.2 PG (ref 26.6–33)
MCHC RBC AUTO-ENTMCNC: 33.9 G/DL (ref 31.5–35.7)
MCV RBC AUTO: 89.1 FL (ref 79–97)
PLATELET # BLD AUTO: 281 10*3/MM3 (ref 140–450)
PMV BLD AUTO: 9.5 FL (ref 6–12)
POTASSIUM SERPL-SCNC: 3.8 MMOL/L (ref 3.5–5.2)
RBC # BLD AUTO: 3.77 10*6/MM3 (ref 4.14–5.8)
SODIUM SERPL-SCNC: 142 MMOL/L (ref 136–145)
WBC NRBC COR # BLD AUTO: 9.08 10*3/MM3 (ref 3.4–10.8)

## 2025-02-14 PROCEDURE — 63710000001 PREDNISONE PER 5 MG: Performed by: INTERNAL MEDICINE

## 2025-02-14 PROCEDURE — 85027 COMPLETE CBC AUTOMATED: CPT | Performed by: NURSE PRACTITIONER

## 2025-02-14 PROCEDURE — 25010000002 HEPARIN (PORCINE) PER 1000 UNITS: Performed by: INTERNAL MEDICINE

## 2025-02-14 PROCEDURE — 80048 BASIC METABOLIC PNL TOTAL CA: CPT | Performed by: INTERNAL MEDICINE

## 2025-02-14 PROCEDURE — 63710000001 PREDNISONE PER 1 MG: Performed by: INTERNAL MEDICINE

## 2025-02-14 RX ORDER — ALLOPURINOL 100 MG/1
100 TABLET ORAL DAILY
Status: DISCONTINUED | OUTPATIENT
Start: 2025-02-14 | End: 2025-02-14 | Stop reason: HOSPADM

## 2025-02-14 RX ORDER — ACETAMINOPHEN 325 MG/1
650 TABLET ORAL 2 TIMES DAILY
Qty: 28 TABLET | Refills: 0 | Status: SHIPPED | OUTPATIENT
Start: 2025-02-14 | End: 2025-02-21

## 2025-02-14 RX ORDER — POLYETHYLENE GLYCOL 3350 17 G/17G
17 POWDER, FOR SOLUTION ORAL DAILY PRN
Status: DISCONTINUED | OUTPATIENT
Start: 2025-02-14 | End: 2025-02-14 | Stop reason: HOSPADM

## 2025-02-14 RX ORDER — BISACODYL 5 MG/1
5 TABLET, DELAYED RELEASE ORAL DAILY PRN
Status: DISCONTINUED | OUTPATIENT
Start: 2025-02-14 | End: 2025-02-14 | Stop reason: HOSPADM

## 2025-02-14 RX ORDER — PREDNISONE 10 MG/1
TABLET ORAL
Qty: 21 TABLET | Refills: 0 | Status: SHIPPED | OUTPATIENT
Start: 2025-02-15 | End: 2025-02-24

## 2025-02-14 RX ORDER — LISINOPRIL 5 MG/1
5 TABLET ORAL DAILY
Qty: 30 TABLET | Refills: 0 | Status: SHIPPED | OUTPATIENT
Start: 2025-02-14

## 2025-02-14 RX ORDER — ALLOPURINOL 100 MG/1
TABLET ORAL
Start: 2025-02-15 | End: 2025-02-14

## 2025-02-14 RX ORDER — AMOXICILLIN 250 MG
2 CAPSULE ORAL 2 TIMES DAILY PRN
Status: DISCONTINUED | OUTPATIENT
Start: 2025-02-14 | End: 2025-02-14 | Stop reason: HOSPADM

## 2025-02-14 RX ORDER — BISACODYL 10 MG
10 SUPPOSITORY, RECTAL RECTAL DAILY PRN
Status: DISCONTINUED | OUTPATIENT
Start: 2025-02-14 | End: 2025-02-14 | Stop reason: HOSPADM

## 2025-02-14 RX ORDER — ALLOPURINOL 100 MG/1
100 TABLET ORAL DAILY
Start: 2025-02-15

## 2025-02-14 RX ORDER — ACETAMINOPHEN 325 MG/1
650 TABLET ORAL EVERY 6 HOURS PRN
Start: 2025-02-14

## 2025-02-14 RX ORDER — AMLODIPINE BESYLATE 5 MG/1
2.5 TABLET ORAL DAILY
Qty: 30 TABLET | Refills: 0 | Status: SHIPPED | OUTPATIENT
Start: 2025-02-14

## 2025-02-14 RX ORDER — COLCHICINE 0.6 MG/1
0.6 TABLET ORAL DAILY
Qty: 30 TABLET | Refills: 0 | Status: SHIPPED | OUTPATIENT
Start: 2025-02-14

## 2025-02-14 RX ADMIN — ALLOPURINOL 100 MG: 100 TABLET ORAL at 10:45

## 2025-02-14 RX ADMIN — ACETAMINOPHEN 650 MG: 325 TABLET, FILM COATED ORAL at 09:25

## 2025-02-14 RX ADMIN — Medication 10 ML: at 09:25

## 2025-02-14 RX ADMIN — POLYETHYLENE GLYCOL 3350 17 G: 17 POWDER, FOR SOLUTION ORAL at 10:45

## 2025-02-14 RX ADMIN — ACETAMINOPHEN 650 MG: 325 TABLET, FILM COATED ORAL at 14:38

## 2025-02-14 RX ADMIN — COLCHICINE 0.6 MG: 0.6 TABLET ORAL at 09:25

## 2025-02-14 RX ADMIN — SENNOSIDES AND DOCUSATE SODIUM 2 TABLET: 50; 8.6 TABLET ORAL at 10:45

## 2025-02-14 RX ADMIN — HEPARIN SODIUM 5000 UNITS: 5000 INJECTION INTRAVENOUS; SUBCUTANEOUS at 09:25

## 2025-02-14 RX ADMIN — PREDNISONE 50 MG: 10 TABLET ORAL at 09:24

## 2025-02-14 NOTE — CASE MANAGEMENT/SOCIAL WORK
Continued Stay Note  [facility]     Patient Name: Jovon Knight  MRN: [id number]  Today's Date: 2/14/2025    Admit Date: 2/11/2025    Plan: [facility]   Discharge Plan       Row Name 02/14/25 1015       Plan    Plan [facility]    Plan Comments S/W Rose/ Sue who now states that a bed is available at [place] which is patient's first choice because his son is a patient there.  Pt notified and is in agreement w/ [facility]. ...........Meeta WALLACE/ MARCO ANTONIO Lomeli RN

## 2025-02-14 NOTE — PLAN OF CARE
Problem: Fall Injury Risk  Goal: Absence of Fall and Fall-Related Injury  Outcome: Progressing  Intervention: Promote Injury-Free Environment  Recent Flowsheet Documentation  Taken 2/14/2025 0400 by Whitney Limon RN  Safety Promotion/Fall Prevention: safety round/check completed  Taken 2/14/2025 0200 by Whitney Limon RN  Safety Promotion/Fall Prevention: safety round/check completed  Taken 2/14/2025 0000 by Whitney Limon RN  Safety Promotion/Fall Prevention:   activity supervised   assistive device/personal items within reach   fall prevention program maintained   nonskid shoes/slippers when out of bed   safety round/check completed  Taken 2/13/2025 2014 by Whitney Limon, RN  Safety Promotion/Fall Prevention:   activity supervised   assistive device/personal items within reach   fall prevention program maintained   nonskid shoes/slippers when out of bed   safety round/check completed   Goal Outcome Evaluation:  Plan of Care Reviewed With: patient        Progress: no change

## 2025-02-14 NOTE — PLAN OF CARE
Goal Outcome Evaluation:              Outcome Evaluation: Patient stable, for discharge [facility], report given to nurse Abraham, needs attended.

## 2025-02-14 NOTE — DISCHARGE SUMMARY
Patient Name: Jovon Knight  : 1940  MRN: [id number]    Date of Admission: 2025  Date of Discharge:  2025  Primary Care Physician: Pinky Ludwig APRN      Chief Complaint:   Foot Pain      Discharge Diagnoses     Active Hospital Problems    Diagnosis  POA    **Gout of both feet [M10.9]  Yes    Foot pain, bilateral [M79.671, M79.672]  Yes    Physical debility [R53.81]  Yes    Osteomyelitis of second toe of left foot [M86.9]  Yes    Moderate protein-calorie malnutrition [E44.0]  Yes    Hyperuricemia [E79.0]  Yes    CKD (chronic kidney disease) [N18.9]  Yes    Colon cancer [C18.9]  Yes    Hyperlipidemia [E78.5]  Yes      Resolved Hospital Problems   No resolved problems to display.        Hospital Course     Mr. Knight is a 84 y.o. male with a history of prior gout, CKD, colon cancer, HLD, and malnutrition who presented to [facility] initially complaining of bilateral foot pain.  Please see the admitting history and physical for further details.  He was found to have gout, and hyperuricemia and was admitted to the hospital for further evaluation and treatment.  Mr. Knight lives alone and has been unable to take care of himself recently due to severe foot pain. He has had a prior gout flare in the past, but he adopted a low purine diet, without shellfish, and stopped drinking beer, and has been free of any flare-up of his gout since. He was started on colchicine, as well as steroids with some improvement in his foot pain.  Podiatry has evaluated, due to MRI concerns for possible osteomyelitis.  Podiatry felt MRI findings was related to gout, and not septic arthritis, or osteomyelitis.Podiatry recommended aspiration if needed, to continue gout treatment and follow up outpatient if needed.   Fortunately he has not required any aspiration, as he has experienced some improvement with steroids and colchicine.   He has been evaluated by physical therapy who recommended SNF at  discharge for physical therapy services to address functional deficits.  Mr. Knight is in agreement to discharge to skilled nursing for physical therapy today, as he lives home alone and does not have a support system that could help him. He has been advised that it may take some time for his gout symptoms resolve.  He is strongly encouraged to establish care with a nephrologist within the next month, with his chronic kidney disease he will need ongoing specialized care and evaluation. He has been provided ambulatory referral to nephrology, and contact information.   He is medically stable for discharge to SNF today, with recommended continuation of gout treatment, with prednisone, allopurinol, scheduled acetaminophen and colchicine. He is encouraged to follow up  outpatient with podiatry for continued management and evaluation of gout and possible osteomyelitis of second toe of left foot.   His blood pressure has been monitored during his hospitalization, he does report a history of hypertension, but does not take any antihypertensives currently. He has been started on amlodipine, and lisinopril at discharge.  He has been advised of the importance of medication compliance and to follow up with this PCP on a regular basis, for continued management of chronic conditions as well as preventative care.   He is reminded to take all medications as they have been prescribed to him, and to attend all follow-up appointments as they have been scheduled.    Day of Discharge     Subjective:  He offers no complaints at this time, he reluctantly optimistic that he will improve at SNF and will be able to return home quickly.     Physical Exam:  Temp:  [97.3 °F (36.3 °C)-98.8 °F (37.1 °C)] 97.9 °F (36.6 °C)  Heart Rate:  [53-58] 57  Resp:  [16] 16  BP: (158-167)/(73-81) 158/74  Body mass index is 20.38 kg/m².  Physical Exam  Vitals and nursing note reviewed.   Constitutional:       General: He is awake. He is not in acute  distress.     Appearance: He is well-developed and underweight.   HENT:      Head: Normocephalic and atraumatic.      Nose: Nose normal.      Mouth/Throat:      Pharynx: No oropharyngeal exudate.   Eyes:      General: No scleral icterus.     Pupils: Pupils are equal, round, and reactive to light.   Neck:      Vascular: No JVD.   Cardiovascular:      Rate and Rhythm: Normal rate and regular rhythm.      Heart sounds: No murmur heard.  Pulmonary:      Effort: Pulmonary effort is normal. No respiratory distress.      Breath sounds: Normal breath sounds. No wheezing.   Abdominal:      General: Bowel sounds are normal. There is no distension.      Palpations: Abdomen is soft.      Tenderness: There is no abdominal tenderness.   Musculoskeletal:         General: Tenderness present.      Cervical back: Normal range of motion.      Right lower leg: No edema.      Left lower leg: No edema.      Comments: Bilateral lower foot pain - Gout    Skin:     General: Skin is warm and dry.      Capillary Refill: Capillary refill takes less than 2 seconds.      Findings: No rash.   Neurological:      General: No focal deficit present.      Mental Status: He is alert and oriented to person, place, and time. Mental status is at baseline.   Psychiatric:         Mood and Affect: Affect is flat.         Behavior: Behavior is cooperative.         Consultants     Consult Orders (all) (From admission, onward)       Start     Ordered    02/12/25 1130  Inpatient Podiatry Consult  Once        Specialty:  Podiatry  Provider:  Mainor Blanco DPM    02/12/25 1130    02/12/25 0634  Inpatient Nutrition Consult  Once        Provider:  (Not yet assigned)    02/12/25 0633    02/11/25 1302  [facility] (on-call MD unless specified) Details  Once        Specialty:  Hospitalist  Provider:  Marci Curtis MD    02/11/25 1303                  Procedures     * Surgery not found *    Imaging Results (All)       Procedure Component Value Units Date/Time    MRI Foot  Left With & Without Contrast [[id number]] Collected: 02/12/25 1044     Updated: 02/12/25 1113    Narrative:      MRI LEFT FOREFOOT WITH AND WITHOUT CONTRAST        HISTORY: Second toe pain with swelling and discoloration.     TECHNIQUE:  MRI includes coronal, short axis T1, T1 fat-sat, T2 fat-sat,  axial T1, STIR, sagittal PD fat-saturated sequences.  Contrast was  administered IV followed by axial, coronal, sagittal T1 fat-saturated  sequences.     COMPARISON: Left foot x-rays 02/11/2025.     FINDINGS: Hallux valgus deformity with chronic arthritic change at the  1st MTP joint. First MTP joint effusion. There is surrounding bone  marrow edema and enhancement within the head of the 1st metatarsal and  base of the 1st proximal phalanx. Marginal erosion within the medial  aspect of the head of the 1st metatarsal.     Second PIP joint space loss with effusion. Fluid signal extends lateral  to the 2nd PIP joint and lateral to the head of the 2nd proximal phalanx  and base of the 2nd  middle phalanx measuring 16 mm in AP dimension.  There is synovial/capsular thickening and enhancement. Small marginal  erosions within the base of the 2nd middle phalanx.     Mild bone marrow edema within the central and medial aspect of the head  of the 3rd proximal phalanx. Bone marrow signal within the 4th and 5th  toes and 2nd through 5th metatarsals is within normal limits. Midfoot  alignment appears normal and Lisfranc ligament is intact.     Thin fluid collection within the subcutaneous fat plantar to the head of  the 5th metatarsal and 5th MTP joint. Fluid collection measures 2.8 x  2.2 x 0.8 cm. No evidence for underlying septic arthritis at the 5th MTP  joint.       Impression:      1. Inflammatory arthritis at the 2nd PIP joint where there is a large  effusion with synovial thickening and surrounding abnormal bone marrow  edema. Marginal erosions base of the 2nd middle phalanx. Findings may be  related to gout arthropathy though  septic arthritis with osteomyelitis  is also in the differential diagnosis.  2. Inflammatory arthritis 1st MTP joint where there is joint effusion  with synovitis and surrounding abnormal bone marrow edema and  enhancement. Marginal erosion medial aspect of the head of the 1st  metatarsal. Findings may be related to gout arthropathy though septic  arthritis with osteomyelitis is also in the differential diagnosis.  3. Thin fluid collection within the subcutaneous fat plantar to the head  of the 5th metatarsal and 5th MTP joint. This is likely a chronic bursal  collection. Infected bursal collection is possible. There is not a great  deal of surrounding enhancement though this could be related to poorly  vascularized tissue.     This report was finalized on 2/12/2025 11:10 AM by Srinivas Salazar M.D  on Workstation: [id number]       XR Foot 3+ View Left [[id number]] Collected: 02/11/25 1243     Updated: 02/11/25 1254    Narrative:      XR FOOT 3+ VW LEFT-, XR FOOT 3+ VW RIGHT-     INDICATIONS: Pain, Gout        TECHNIQUE: 4 views of the left foot, 5 views of the right foot     COMPARISON: Right foot x-ray from 5/13/2022     FINDINGS:     Erosion of the medial aspect of the right first metatarsal head appears  similar to prior exam, again compatible with gout. Erosion is also  evident at the medial margin of the left first metatarsal head, with  overhanging osteophyte, likewise compatible with gout, but correlate  clinically to exclude any possibility of infectious etiology. Small  erosion at the medial margin of the base of the left second middle  phalanx is indeterminate, could for example represent inflammatory  arthropathy such as gout, or could be evidence of osteomyelitis,  correlate clinically. No other erosions are noted. No acute fracture is  noted. Mild right and mild to moderate left calcaneal spurring is  present. Mild osteoarthritic degenerative changes are present. Soft  tissue swelling is seen in both  feet, especially around the bilateral  great toes, left second toe, and around the bilateral fifth metatarsal  phalangeal joints. Arterial calcifications are conspicuous in both feet.  If there is clinical suspicion for radiographically occult  osteomyelitis, bone scan or MRI can be obtained.       Impression:         As described.           This report was finalized on 2/11/2025 12:51 PM by Dr. Zeb Mart M.D on Workstation: [id number]       XR Foot 3+ View Right [[id number]] Collected: 02/11/25 1243     Updated: 02/11/25 1254    Narrative:      XR FOOT 3+ VW LEFT-, XR FOOT 3+ VW RIGHT-     INDICATIONS: Pain, Gout        TECHNIQUE: 4 views of the left foot, 5 views of the right foot     COMPARISON: Right foot x-ray from 5/13/2022     FINDINGS:     Erosion of the medial aspect of the right first metatarsal head appears  similar to prior exam, again compatible with gout. Erosion is also  evident at the medial margin of the left first metatarsal head, with  overhanging osteophyte, likewise compatible with gout, but correlate  clinically to exclude any possibility of infectious etiology. Small  erosion at the medial margin of the base of the left second middle  phalanx is indeterminate, could for example represent inflammatory  arthropathy such as gout, or could be evidence of osteomyelitis,  correlate clinically. No other erosions are noted. No acute fracture is  noted. Mild right and mild to moderate left calcaneal spurring is  present. Mild osteoarthritic degenerative changes are present. Soft  tissue swelling is seen in both feet, especially around the bilateral  great toes, left second toe, and around the bilateral fifth metatarsal  phalangeal joints. Arterial calcifications are conspicuous in both feet.  If there is clinical suspicion for radiographically occult  osteomyelitis, bone scan or MRI can be obtained.       Impression:         As described.           This report was finalized on 2/11/2025 12:51 PM  "by Dr. Zeb Mart M.D on Workstation: [id number]                 Pertinent Labs     Results from last 7 days   Lab Units 02/14/25 0340 02/13/25  1441 02/13/25 0524 02/12/25 0412   WBC 10*3/mm3 9.08 9.67 11.48* 9.62   HEMOGLOBIN g/dL 11.4* 11.5* 10.7* 11.2*   PLATELETS 10*3/mm3 281 260 247 255     Results from last 7 days   Lab Units 02/14/25 0340 02/13/25 0524 02/12/25 0412 02/11/25  1111   SODIUM mmol/L 142 147* 143 145   POTASSIUM mmol/L 3.8 3.9 4.3 4.8   CHLORIDE mmol/L 113* 117* 111* 111*   CO2 mmol/L 18.5* 20.4* 21.5* 21.0*   BUN mg/dL 38* 39* 41* 38*   CREATININE mg/dL 1.39* 1.45* 1.46* 1.39*   GLUCOSE mg/dL 111* 101* 145* 107*   EGFR mL/min/1.73 49.4* 46.9* 46.5* 49.4*     Results from last 7 days   Lab Units 02/12/25 0412 02/11/25  1111   ALBUMIN g/dL 2.8* 3.8   BILIRUBIN mg/dL 0.3 0.4   ALK PHOS U/L 88 115   AST (SGOT) U/L 14 17   ALT (SGPT) U/L 9 12     Results from last 7 days   Lab Units 02/14/25 0340 02/13/25 0524 02/12/25 0412 02/11/25  1111   CALCIUM mg/dL 8.7 8.8 8.7 9.7   ALBUMIN g/dL  --   --  2.8* 3.8   PHOSPHORUS mg/dL  --  2.4*  --   --        Results from last 7 days   Lab Units 02/12/25 0412   PROBNP pg/mL 855.0     Results from last 7 days   Lab Units 02/11/25  1111   URIC ACID mg/dL 10.6*         Invalid input(s): \"LDLCALC\"          Test Results Pending at Discharge     Pending Results       None              Discharge Details        Discharge Medications        New Medications        Instructions Start Date   acetaminophen 325 MG tablet  Commonly known as: TYLENOL   650 mg, Oral, Every 6 Hours PRN      acetaminophen 325 MG tablet  Commonly known as: TYLENOL   650 mg, Oral, 2 Times Daily, Give twice daily for 7 days then as needed thereafter      allopurinol 100 MG tablet  Commonly known as: ZYLOPRIM   100 mg, Oral, Daily   Start Date: February 15, 2025     amLODIPine 5 MG tablet  Commonly known as: NORVASC   2.5 mg, Oral, Daily      colchicine 0.6 MG tablet   0.6 mg, Oral, " Daily, Give colchicine daily until gout symptoms have stabilized then can take daily as needed.      lisinopril 5 MG tablet  Commonly known as: PRINIVIL,ZESTRIL   5 mg, Oral, Daily      predniSONE 10 MG tablet  Commonly known as: DELTASONE   Take 4 tablets by mouth Daily With Breakfast for 3 days, THEN 2 tablets Daily With Breakfast for 3 days, THEN 1 tablet Daily With Breakfast for 3 days.   Start Date: February 15, 2025              No Known Allergies    Discharge Disposition:  Skilled Nursing Facility (DC - External)      Discharge Diet:  Diet Order   Procedures    Diet: Regular/House; Fluid Consistency: Thin (IDDSI 0)       Discharge Activity:   Activity Instructions       Activity as Tolerated              CODE STATUS:    Code Status and Medical Interventions: CPR (Attempt to Resuscitate); Full Support   Ordered at: 02/11/25 2222     Code Status (Patient has no pulse and is not breathing):    CPR (Attempt to Resuscitate)     Medical Interventions (Patient has pulse or is breathing):    Full Support       No future appointments.  Additional Instructions for the Follow-ups that You Need to Schedule       Discharge Follow-up with PCP   As directed       Currently Documented PCP:    Pinky Ludwig APRN    PCP Phone Number:    [phone]     Follow Up Details: Recommend primary care provider follow-up within 1 week after discharge from skilled facility.  Please call to schedule               Contact information for follow-up providers       Juan Gracia MD Follow up in 1 month(s).    Specialty: Nephrology  Why: Recommend you establish care with a nephrologist.  Please call nephrology clinic to arrange an appointment.  Contact information:  [address]  [place] [address]  [phone]               Pinky Ludwig APRN .    Specialties: Nurse Practitioner, Family Medicine  Why: Recommend primary care provider follow-up within 1 week after discharge from skilled facility.  Please  call to schedule  Contact information:  [address]  [facility] [id number] [phone]               Juan Gracia MD .    Specialty: Nephrology  Contact information:  [address]  [address]  [facility] [address]  [phone]                       Contact information for after-discharge care       Destination       [facility] .    Service: Skilled Nursing  Contact information:  [address] [facility] [address] [phone]                                   Additional Instructions for the Follow-ups that You Need to Schedule       Discharge Follow-up with PCP   As directed       Currently Documented PCP:    Pinky Ludwig APRN    PCP Phone Number:    [phone]     Follow Up Details: Recommend primary care provider follow-up within 1 week after discharge from skilled facility.  Please call to schedule            Time Spent on Discharge:  Greater than 30 minutes      BEATA Ag  [facility]  02/14/25  10:16 EST

## 2025-02-14 NOTE — CASE MANAGEMENT/SOCIAL WORK
Continued Stay Note  [facility]     Patient Name: Jovon Knight  MRN: [id number]  Today's Date: 2/14/2025    Admit Date: 2/11/2025    Plan: [facility]   Discharge Plan       Row Name 02/14/25 0926       Plan    Plan [facility]    Plan Comments DC orders noted.  S/W [facility]/ [facility] - pt is accepted and bed is available at [facility] today.  Pt notified and is in agreement. ..............Meeta WALLACE/ MARCO ANTONIO                   Discharge Codes    No documentation.                 Expected Discharge Date and Time       Expected Discharge Date Expected Discharge Time    Feb 14, 2025               Meeta Lomeli RN

## 2025-02-14 NOTE — CASE MANAGEMENT/SOCIAL WORK
Continued Stay Note  [facility]     Patient Name: Jovon Knight  MRN: [id number]  Today's Date: 2/14/2025    Admit Date: 2/11/2025    Plan: [facility]   Discharge Plan       Row Name 02/14/25 1304       Plan    Plan Comments DC orders noted.  S/W Rose/ [facility], bed remains available at [facility].  S/W pt who is in agreement w/ DC to skilled bed at [facility] today.  DC summary and DC packet given to nursing.  Hermila w/[facility] g[facility] arranged for today at 1600.  Reservation #: [id number]    Final Note DC to skilled bed at [facility]. ..........Meeta WALLACE/ CCP      Row Name 02/14/25 1015       Plan    Plan [facility]    Plan Comments S/W Rose/ [facility] who now states that a bed is available at [facility] which is patient's first choice because his son is a patient there.  Pt notified and is in agreement / [facility]. ...........Meeta WALLACE/ CCP                   Discharge Codes    No documentation.                 Expected Discharge Date and Time       Expected Discharge Date Expected Discharge Time    Feb 14, 2025               Meeta Lomeli RN

## 2025-08-12 ENCOUNTER — TELEPHONE (OUTPATIENT)
Dept: FAMILY MEDICINE CLINIC | Facility: CLINIC | Age: 85
End: 2025-08-12
Payer: COMMERCIAL